# Patient Record
Sex: FEMALE | Race: WHITE | Employment: FULL TIME | URBAN - METROPOLITAN AREA
[De-identification: names, ages, dates, MRNs, and addresses within clinical notes are randomized per-mention and may not be internally consistent; named-entity substitution may affect disease eponyms.]

---

## 2017-04-04 ENCOUNTER — TELEPHONE (OUTPATIENT)
Dept: FAMILY MEDICINE CLINIC | Facility: CLINIC | Age: 47
End: 2017-04-04

## 2017-07-24 ENCOUNTER — OFFICE VISIT (OUTPATIENT)
Dept: FAMILY MEDICINE CLINIC | Facility: CLINIC | Age: 47
End: 2017-07-24

## 2017-07-24 VITALS
DIASTOLIC BLOOD PRESSURE: 88 MMHG | HEART RATE: 70 BPM | HEIGHT: 65 IN | BODY MASS INDEX: 34.16 KG/M2 | WEIGHT: 205 LBS | SYSTOLIC BLOOD PRESSURE: 120 MMHG | TEMPERATURE: 98 F | RESPIRATION RATE: 18 BRPM | OXYGEN SATURATION: 98 %

## 2017-07-24 DIAGNOSIS — Z12.31 SCREENING MAMMOGRAM, ENCOUNTER FOR: ICD-10-CM

## 2017-07-24 DIAGNOSIS — I83.93 VARICOSE VEINS OF BOTH LOWER EXTREMITIES: ICD-10-CM

## 2017-07-24 DIAGNOSIS — N39.3 STRESS INCONTINENCE: ICD-10-CM

## 2017-07-24 DIAGNOSIS — Z01.419 WELL WOMAN EXAM: Primary | ICD-10-CM

## 2017-07-24 PROCEDURE — 99396 PREV VISIT EST AGE 40-64: CPT | Performed by: INTERNAL MEDICINE

## 2017-07-24 RX ORDER — OXYBUTYNIN CHLORIDE 5 MG/1
5 TABLET ORAL 2 TIMES DAILY
Qty: 60 TABLET | Refills: 3 | Status: SHIPPED | OUTPATIENT
Start: 2017-07-24 | End: 2017-12-23

## 2017-07-24 NOTE — PROGRESS NOTES
HPI:   Israel Thakkar is a 55year old female who presents for a well woman exam. Symptoms: denies discharge, itching, burning or dysuria, periods are regular, flow is 4 days. History of migraines since age 9.  Typically gets them 1-2 x/month, lately they excessive thirst, denies significant weight change  ALL/ASTHMA: denies hx of allergy or asthma    EXAM:   /88   Pulse 70   Temp 97.8 °F (36.6 °C) (Oral)   Resp 18   Ht 65\"   Wt 205 lb   LMP 07/04/2017   SpO2 98%   BMI 34.11 kg/m²       Body mass ind

## 2017-07-24 NOTE — PATIENT INSTRUCTIONS
4 Steps for Eating Healthier  Changing the way you eat can improve your health. It can lower your cholesterol and blood pressure, and help you stay at a healthy weight. Your diet doesn’t have to be bland and boring to be healthy.  Just watch your calories · Drink more water to match your fiber increase. This is to help prevent constipation. Date Last Reviewed: 5/11/2015  © 2403-5593 The [facility] [place], [address] [place] [address]. All rights reserved.  This information is not intended a

## 2017-08-01 ENCOUNTER — LAB ENCOUNTER (OUTPATIENT)
Dept: LAB | Age: 47
End: 2017-08-01
Attending: FAMILY MEDICINE
Payer: COMMERCIAL

## 2017-08-01 ENCOUNTER — HOSPITAL ENCOUNTER (OUTPATIENT)
Dept: MAMMOGRAPHY | Age: 47
Discharge: HOME | End: 2017-08-01
Attending: INTERNAL MEDICINE
Payer: COMMERCIAL

## 2017-08-01 DIAGNOSIS — Z12.31 SCREENING MAMMOGRAM, ENCOUNTER FOR: ICD-10-CM

## 2017-08-01 DIAGNOSIS — Z01.419 WELL WOMAN EXAM: ICD-10-CM

## 2017-08-01 LAB
25-HYDROXYVITAMIN D (TOTAL): 31 NG/ML (ref 30–100)
ALBUMIN SERPL-MCNC: 3.2 G/DL (ref 3.5–4.8)
ALP LIVER SERPL-CCNC: 76 U/L (ref 39–100)
ALT SERPL-CCNC: 13 U/L (ref 14–54)
AST SERPL-CCNC: 10 U/L (ref 15–41)
BASOPHILS # BLD AUTO: 0.07 X10(3) UL (ref 0–0.1)
BASOPHILS NFR BLD AUTO: 1.1 %
BILIRUB SERPL-MCNC: 0.3 MG/DL (ref 0.1–2)
BUN BLD-MCNC: 15 MG/DL (ref 8–20)
CALCIUM BLD-MCNC: 8.9 MG/DL (ref 8.3–10.3)
CHLORIDE: 105 MMOL/L (ref 101–111)
CHOLEST SMN-MCNC: 208 MG/DL (ref ?–200)
CO2: 25 MMOL/L (ref 22–32)
CREAT BLD-MCNC: 0.96 MG/DL (ref 0.55–1.02)
EOSINOPHIL # BLD AUTO: 0.13 X10(3) UL (ref 0–0.3)
EOSINOPHIL NFR BLD AUTO: 2 %
ERYTHROCYTE [DISTWIDTH] IN BLOOD BY AUTOMATED COUNT: 13.1 % (ref 11.5–16)
GLUCOSE BLD-MCNC: 85 MG/DL (ref 70–99)
HCT VFR BLD AUTO: 37.2 % (ref 34–50)
HDLC SERPL-MCNC: 41 MG/DL (ref 45–?)
HDLC SERPL: 5.07 {RATIO} (ref ?–4.44)
HGB BLD-MCNC: 12 G/DL (ref 12–16)
IMMATURE GRANULOCYTE COUNT: 0.02 X10(3) UL (ref 0–1)
IMMATURE GRANULOCYTE RATIO %: 0.3 %
LDLC SERPL CALC-MCNC: 122 MG/DL (ref ?–130)
LDLC SERPL-MCNC: 45 MG/DL (ref 5–40)
LYMPHOCYTES # BLD AUTO: 2.41 X10(3) UL (ref 0.9–4)
LYMPHOCYTES NFR BLD AUTO: 36.3 %
M PROTEIN MFR SERPL ELPH: 7.7 G/DL (ref 6.1–8.3)
MCH RBC QN AUTO: 30.8 PG (ref 27–33.2)
MCHC RBC AUTO-ENTMCNC: 32.3 G/DL (ref 31–37)
MCV RBC AUTO: 95.4 FL (ref 81–100)
MONOCYTES # BLD AUTO: 0.43 X10(3) UL (ref 0.1–0.6)
MONOCYTES NFR BLD AUTO: 6.5 %
NEUTROPHIL ABS PRELIM: 3.57 X10 (3) UL (ref 1.3–6.7)
NEUTROPHILS # BLD AUTO: 3.57 X10(3) UL (ref 1.3–6.7)
NEUTROPHILS NFR BLD AUTO: 53.8 %
NONHDLC SERPL-MCNC: 167 MG/DL (ref ?–130)
PLATELET # BLD AUTO: 338 10(3)UL (ref 150–450)
POTASSIUM SERPL-SCNC: 4 MMOL/L (ref 3.6–5.1)
RBC # BLD AUTO: 3.9 X10(6)UL (ref 3.8–5.1)
RED CELL DISTRIBUTION WIDTH-SD: 46 FL (ref 35.1–46.3)
SODIUM SERPL-SCNC: 139 MMOL/L (ref 136–144)
TRIGLYCERIDES: 224 MG/DL (ref ?–150)
TSI SER-ACNC: 1.46 MIU/ML (ref 0.35–5.5)
WBC # BLD AUTO: 6.6 X10(3) UL (ref 4–13)

## 2017-08-01 PROCEDURE — 77063 BREAST TOMOSYNTHESIS BI: CPT | Performed by: INTERNAL MEDICINE

## 2017-08-01 PROCEDURE — 84443 ASSAY THYROID STIM HORMONE: CPT

## 2017-08-01 PROCEDURE — 77067 SCR MAMMO BI INCL CAD: CPT | Performed by: INTERNAL MEDICINE

## 2017-08-01 PROCEDURE — 85025 COMPLETE CBC W/AUTO DIFF WBC: CPT

## 2017-08-01 PROCEDURE — 80053 COMPREHEN METABOLIC PANEL: CPT

## 2017-08-01 PROCEDURE — 82306 VITAMIN D 25 HYDROXY: CPT

## 2017-08-01 PROCEDURE — 80061 LIPID PANEL: CPT

## 2017-08-01 PROCEDURE — 36415 COLL VENOUS BLD VENIPUNCTURE: CPT

## 2017-08-10 ENCOUNTER — OFFICE VISIT (OUTPATIENT)
Dept: SURGERY | Facility: CLINIC | Age: 47
End: 2017-08-10

## 2017-08-10 VITALS
RESPIRATION RATE: 17 BRPM | HEIGHT: 65 IN | WEIGHT: 207 LBS | TEMPERATURE: 98 F | HEART RATE: 96 BPM | SYSTOLIC BLOOD PRESSURE: 132 MMHG | BODY MASS INDEX: 34.49 KG/M2 | DIASTOLIC BLOOD PRESSURE: 87 MMHG

## 2017-08-10 DIAGNOSIS — M79.605 LEG PAIN, LEFT: Primary | ICD-10-CM

## 2017-08-10 PROCEDURE — 99243 OFF/OP CNSLTJ NEW/EST LOW 30: CPT | Performed by: SURGERY

## 2017-08-11 NOTE — H&P
New Patient  Norma Dean  10/20/1970    8/10/2017    This patient was referred by Lula Moreno MD for evaluation and consultation for left leg pain . Chief Complaint: left leg pain    History of Present Illness:  The patient is a 68-year-old femal History   Problem Relation Age of Onset   • Breast Cancer Maternal Grandmother 72   • Ovarian Cancer Paternal Grandmother 76   • Asthma Mother    • kidney cancer [OTHER] Father        Objective/Physical Exam:  General: Alert, orientated x3. Cooperative. that over the past 12 months she has had for specific episodes of left leg pain. She notes that these episodes may have been triggered by excessive activity and exercise.   She notes that the pain in the left leg is in the anterior medial aspect of the pop

## 2017-12-26 RX ORDER — OXYBUTYNIN CHLORIDE 5 MG/1
TABLET ORAL
Qty: 60 TABLET | Refills: 6 | Status: SHIPPED | OUTPATIENT
Start: 2017-12-26 | End: 2021-01-04

## 2018-01-29 ENCOUNTER — OFFICE VISIT (OUTPATIENT)
Dept: FAMILY MEDICINE CLINIC | Facility: CLINIC | Age: 48
End: 2018-01-29

## 2018-01-29 VITALS
SYSTOLIC BLOOD PRESSURE: 120 MMHG | DIASTOLIC BLOOD PRESSURE: 82 MMHG | RESPIRATION RATE: 16 BRPM | HEIGHT: 64.5 IN | OXYGEN SATURATION: 97 % | BODY MASS INDEX: 33.73 KG/M2 | HEART RATE: 112 BPM | TEMPERATURE: 99 F | WEIGHT: 200 LBS

## 2018-01-29 DIAGNOSIS — J02.9 PHARYNGITIS, UNSPECIFIED ETIOLOGY: ICD-10-CM

## 2018-01-29 DIAGNOSIS — J02.9 SORE THROAT: Primary | ICD-10-CM

## 2018-01-29 LAB — CONTROL LINE PRESENT WITH A CLEAR BACKGROUND (YES/NO): YES YES/NO

## 2018-01-29 PROCEDURE — 87081 CULTURE SCREEN ONLY: CPT | Performed by: NURSE PRACTITIONER

## 2018-01-29 PROCEDURE — 87880 STREP A ASSAY W/OPTIC: CPT | Performed by: NURSE PRACTITIONER

## 2018-01-29 PROCEDURE — 99213 OFFICE O/P EST LOW 20 MIN: CPT | Performed by: NURSE PRACTITIONER

## 2018-01-29 NOTE — PATIENT INSTRUCTIONS
Pharyngitis (Sore Throat), Report Pending    Pharyngitis (sore throat) is often due to a virus. It can also be caused by the streptococcus, or strep, bacterium, often called strep throat.  Both viral and strep infections can cause throat pain that is wors · For children: Use acetaminophen for fever, fussiness, or discomfort.  In infants older than 10months of age, you may use ibuprofen instead of acetaminophen. Talk with your child's healthcare provider before giving these medicines if your child has chronic · Signs of dehydration (very dark urine or no urine, sunken eyes, dizziness)  · Trouble breathing or noisy breathing  · Muffled voice  · New rash  · Child appears to be getting sicker  Date Last Reviewed: 4/13/2015  © 2221-9039 The [employer] [address].  8

## 2018-01-29 NOTE — PROGRESS NOTES
CHIEF COMPLAINT:   Patient presents with:  Sore Throat: 4 days. HPI:   Piper Diez is a 52year old female presents to clinic with complaint of sore throat. Patient has had for 4 days. Symptoms have been unchanged since onset.   Patient reports labored. CARDIO: RRR without murmur  GI: good BS's,no masses, hepatosplenomegaly, or tenderness on direct palpation  EXTREMITIES: no cyanosis, clubbing or edema  LYMPH: no anterior cervical. no submandibular lymphadenopathy.   No posterior cervical or occi

## 2018-05-12 ENCOUNTER — HOSPITAL ENCOUNTER (OUTPATIENT)
Age: 48
Discharge: HOME | End: 2018-05-12
Payer: COMMERCIAL

## 2018-05-12 VITALS
RESPIRATION RATE: 18 BRPM | SYSTOLIC BLOOD PRESSURE: 145 MMHG | TEMPERATURE: 97 F | DIASTOLIC BLOOD PRESSURE: 90 MMHG | HEART RATE: 74 BPM | OXYGEN SATURATION: 98 %

## 2018-05-12 DIAGNOSIS — Z91.09 SUN ALLERGY: ICD-10-CM

## 2018-05-12 DIAGNOSIS — R21 RASH AND NONSPECIFIC SKIN ERUPTION: Primary | ICD-10-CM

## 2018-05-12 PROCEDURE — 99203 OFFICE O/P NEW LOW 30 MIN: CPT

## 2018-05-12 PROCEDURE — 99213 OFFICE O/P EST LOW 20 MIN: CPT

## 2018-05-12 RX ORDER — PREDNISONE 10 MG/1
TABLET ORAL
Qty: 30 TABLET | Refills: 0 | Status: SHIPPED | OUTPATIENT
Start: 2018-05-12 | End: 2018-09-04 | Stop reason: ALTCHOICE

## 2018-05-12 NOTE — ED PROVIDER NOTES
Patient Seen in: [facility]    History   Patient presents with:  Rash    Stated Complaint: Rash x 7 days    HPI    59-year-old female who states that she is extremely sensitive to the sun and every year in the beginning of sprin Pulmonary/Chest: Effort normal and breath sounds normal. No respiratory distress. She has no wheezes. She has no rales. Musculoskeletal: Normal range of motion. She exhibits no edema or tenderness. Lymphadenopathy:     She has no cervical adenopathy. I have given the patient instructions regarding her diagnosis, expectations, follow up, and return to the ER precautions.   I explained to the patient that emergent conditions may arise to return to the immediate care or ER for new, worsening or any persist

## 2018-09-04 ENCOUNTER — OFFICE VISIT (OUTPATIENT)
Dept: FAMILY MEDICINE CLINIC | Facility: CLINIC | Age: 48
End: 2018-09-04
Payer: COMMERCIAL

## 2018-09-04 VITALS
DIASTOLIC BLOOD PRESSURE: 76 MMHG | RESPIRATION RATE: 18 BRPM | OXYGEN SATURATION: 99 % | HEIGHT: 65 IN | HEART RATE: 78 BPM | BODY MASS INDEX: 31.16 KG/M2 | TEMPERATURE: 98 F | SYSTOLIC BLOOD PRESSURE: 118 MMHG | WEIGHT: 187 LBS

## 2018-09-04 DIAGNOSIS — G43.009 MIGRAINE WITHOUT AURA AND WITHOUT STATUS MIGRAINOSUS, NOT INTRACTABLE: ICD-10-CM

## 2018-09-04 DIAGNOSIS — Z12.31 ENCOUNTER FOR SCREENING MAMMOGRAM FOR MALIGNANT NEOPLASM OF BREAST: ICD-10-CM

## 2018-09-04 DIAGNOSIS — Z00.00 ANNUAL PHYSICAL EXAM: Primary | ICD-10-CM

## 2018-09-04 PROCEDURE — 99396 PREV VISIT EST AGE 40-64: CPT | Performed by: INTERNAL MEDICINE

## 2018-09-04 NOTE — PATIENT INSTRUCTIONS
LAB HOURS & LOCATIONS        Clarita  [facility])    [address]  [address] [place] [address]     ([facility])         [address] [place] [address] Ave    Mon-Fri  5am-8pm     Mon-Fri   7am-4pm  Sat         6am-3pm     Sat          7am-3pm      Alexsandra Ordaz

## 2018-09-05 ENCOUNTER — OCC HEALTH (OUTPATIENT)
Dept: OCCUPATIONAL MEDICINE | Age: 48
End: 2018-09-05
Attending: PHYSICIAN ASSISTANT

## 2018-09-07 ENCOUNTER — OFFICE VISIT (OUTPATIENT)
Dept: OCCUPATIONAL MEDICINE | Age: 48
End: 2018-09-07
Attending: PHYSICIAN ASSISTANT

## 2018-09-08 NOTE — PROGRESS NOTES
HPI:   Elkin Verma is a 52year old female who presents for a well woman exam. Symptoms: denies discharge, itching, burning or dysuria, periods are regular, flow is 4-5 days, few days of spotting afterwards.     Patient's last menstrual period was 08/16 thyroid history, denies excessive thirst, denies significant weight change  ALL/ASTHMA: denies hx of allergy or asthma    EXAM:   /76   Pulse 78   Temp 98.3 °F (36.8 °C) (Oral)   Resp 18   Ht 65\"   Wt 187 lb   LMP 08/16/2018   SpO2 99%   BMI 31.12 k

## 2018-11-05 ENCOUNTER — LAB ENCOUNTER (OUTPATIENT)
Dept: LAB | Age: 48
End: 2018-11-05
Attending: FAMILY MEDICINE
Payer: COMMERCIAL

## 2018-11-05 ENCOUNTER — TELEPHONE (OUTPATIENT)
Dept: FAMILY MEDICINE CLINIC | Facility: CLINIC | Age: 48
End: 2018-11-05

## 2018-11-05 DIAGNOSIS — G43.009 MIGRAINE WITHOUT AURA AND WITHOUT STATUS MIGRAINOSUS, NOT INTRACTABLE: ICD-10-CM

## 2018-11-05 DIAGNOSIS — Z00.00 ANNUAL PHYSICAL EXAM: ICD-10-CM

## 2018-11-05 DIAGNOSIS — E78.00 PURE HYPERCHOLESTEROLEMIA: Primary | ICD-10-CM

## 2018-11-05 PROCEDURE — 80050 GENERAL HEALTH PANEL: CPT | Performed by: INTERNAL MEDICINE

## 2018-11-05 PROCEDURE — 80061 LIPID PANEL: CPT | Performed by: INTERNAL MEDICINE

## 2018-11-05 PROCEDURE — 86003 ALLG SPEC IGE CRUDE XTRC EA: CPT | Performed by: INTERNAL MEDICINE

## 2018-11-05 PROCEDURE — 36415 COLL VENOUS BLD VENIPUNCTURE: CPT | Performed by: INTERNAL MEDICINE

## 2018-11-05 PROCEDURE — 82306 VITAMIN D 25 HYDROXY: CPT | Performed by: INTERNAL MEDICINE

## 2018-11-05 NOTE — TELEPHONE ENCOUNTER
Pt drop off a wellness form from [facility], pt is requesting to pick it up by Wednesday if possible. Pt mentioned she was just here for her px with np Amrit Cabrera. Please call when form is ready for .  Form place

## 2018-11-06 ENCOUNTER — HOSPITAL ENCOUNTER (OUTPATIENT)
Dept: MAMMOGRAPHY | Age: 48
Discharge: HOME | End: 2018-11-06
Attending: INTERNAL MEDICINE
Payer: COMMERCIAL

## 2018-11-06 DIAGNOSIS — Z12.31 ENCOUNTER FOR SCREENING MAMMOGRAM FOR MALIGNANT NEOPLASM OF BREAST: ICD-10-CM

## 2018-11-06 PROCEDURE — 77067 SCR MAMMO BI INCL CAD: CPT | Performed by: INTERNAL MEDICINE

## 2018-11-06 PROCEDURE — 77063 BREAST TOMOSYNTHESIS BI: CPT | Performed by: INTERNAL MEDICINE

## 2018-11-07 NOTE — TELEPHONE ENCOUNTER
Form and PE done 9-4-18 reviewed per AV as YONATHAN is out of office, signed per AV, given to pt. Copy sent to scan. Task completed.

## 2019-07-31 ENCOUNTER — PATIENT MESSAGE (OUTPATIENT)
Dept: FAMILY MEDICINE CLINIC | Facility: CLINIC | Age: 49
End: 2019-07-31

## 2019-07-31 NOTE — TELEPHONE ENCOUNTER
From: Diego Dean  To: Viktoria Bynum NP  Sent: 7/31/2019 12:01 PM CDT  Subject: Other    Hi, I will be beginning a new job at [employer] and need the attached form filled out.  The HR department said an exam in the last twelve months

## 2021-01-04 ENCOUNTER — OFFICE VISIT (OUTPATIENT)
Dept: FAMILY MEDICINE CLINIC | Facility: CLINIC | Age: 51
End: 2021-01-04
Payer: COMMERCIAL

## 2021-01-04 VITALS
TEMPERATURE: 98 F | HEIGHT: 65 IN | WEIGHT: 207 LBS | DIASTOLIC BLOOD PRESSURE: 80 MMHG | SYSTOLIC BLOOD PRESSURE: 132 MMHG | BODY MASS INDEX: 34.49 KG/M2 | OXYGEN SATURATION: 98 % | RESPIRATION RATE: 20 BRPM | HEART RATE: 85 BPM

## 2021-01-04 DIAGNOSIS — Z00.00 ROUTINE GENERAL MEDICAL EXAMINATION AT A HEALTH CARE FACILITY: Primary | ICD-10-CM

## 2021-01-04 DIAGNOSIS — Z12.4 SCREENING FOR CERVICAL CANCER: ICD-10-CM

## 2021-01-04 DIAGNOSIS — Z12.31 ENCOUNTER FOR SCREENING MAMMOGRAM FOR BREAST CANCER: ICD-10-CM

## 2021-01-04 PROCEDURE — 88175 CYTOPATH C/V AUTO FLUID REDO: CPT | Performed by: INTERNAL MEDICINE

## 2021-01-04 PROCEDURE — 3079F DIAST BP 80-89 MM HG: CPT | Performed by: INTERNAL MEDICINE

## 2021-01-04 PROCEDURE — 99396 PREV VISIT EST AGE 40-64: CPT | Performed by: INTERNAL MEDICINE

## 2021-01-04 PROCEDURE — 3075F SYST BP GE 130 - 139MM HG: CPT | Performed by: INTERNAL MEDICINE

## 2021-01-04 PROCEDURE — 87624 HPV HI-RISK TYP POOLED RSLT: CPT | Performed by: INTERNAL MEDICINE

## 2021-01-04 PROCEDURE — 3008F BODY MASS INDEX DOCD: CPT | Performed by: INTERNAL MEDICINE

## 2021-01-04 RX ORDER — EPINEPHRINE 0.3 MG/.3ML
1 SOLUTION INTRAMUSCULAR; SUBCUTANEOUS AS NEEDED
Qty: 1 EACH | Refills: 0 | Status: SHIPPED | OUTPATIENT
Start: 2021-01-04 | End: 2021-01-05

## 2021-01-04 NOTE — PROGRESS NOTES
HPI:   Corey Venegas is a 48year old female who presents for a well woman exam. Symptoms: denies discharge, itching, burning or dysuria, periods are regular, flow is lesser days. Very few hot flashes/night sweats.     Patient's last menstrual period was LMP 12/23/2020   SpO2 98%   BMI 34.45 kg/m²       Body mass index is 34.45 kg/m².       GENERAL: well developed, well nourished,in no apparent distress  SKIN: warm & dry  HEENT: atraumatic, normocephalic; PERRLA, conjunctiva clear, ears and throat are clear

## 2021-01-06 ENCOUNTER — LAB ENCOUNTER (OUTPATIENT)
Dept: LAB | Age: 51
End: 2021-01-06
Attending: INTERNAL MEDICINE
Payer: COMMERCIAL

## 2021-01-06 DIAGNOSIS — Z00.00 ROUTINE GENERAL MEDICAL EXAMINATION AT A HEALTH CARE FACILITY: ICD-10-CM

## 2021-01-06 LAB
ALBUMIN SERPL-MCNC: 3.4 G/DL (ref 3.4–5)
ALBUMIN/GLOB SERPL: 0.7 {RATIO} (ref 1–2)
ALP LIVER SERPL-CCNC: 83 U/L
ALT SERPL-CCNC: 12 U/L
ANION GAP SERPL CALC-SCNC: 7 MMOL/L (ref 0–18)
AST SERPL-CCNC: 9 U/L (ref 15–37)
BASOPHILS # BLD AUTO: 0.05 X10(3) UL (ref 0–0.2)
BASOPHILS NFR BLD AUTO: 0.7 %
BILIRUB SERPL-MCNC: 0.4 MG/DL (ref 0.1–2)
BUN BLD-MCNC: 11 MG/DL (ref 7–18)
BUN/CREAT SERPL: 14.3 (ref 10–20)
CALCIUM BLD-MCNC: 8.8 MG/DL (ref 8.5–10.1)
CHLORIDE SERPL-SCNC: 106 MMOL/L (ref 98–112)
CHOLEST SMN-MCNC: 263 MG/DL (ref ?–200)
CO2 SERPL-SCNC: 26 MMOL/L (ref 21–32)
CREAT BLD-MCNC: 0.77 MG/DL
DEPRECATED RDW RBC AUTO: 48.9 FL (ref 35.1–46.3)
EOSINOPHIL # BLD AUTO: 0.15 X10(3) UL (ref 0–0.7)
EOSINOPHIL NFR BLD AUTO: 2.2 %
ERYTHROCYTE [DISTWIDTH] IN BLOOD BY AUTOMATED COUNT: 13.9 % (ref 11–15)
GLOBULIN PLAS-MCNC: 4.7 G/DL (ref 2.8–4.4)
GLUCOSE BLD-MCNC: 99 MG/DL (ref 70–99)
HCT VFR BLD AUTO: 37.6 %
HDLC SERPL-MCNC: 54 MG/DL (ref 40–59)
HGB BLD-MCNC: 12 G/DL
HPV I/H RISK 1 DNA SPEC QL NAA+PROBE: NEGATIVE
IMM GRANULOCYTES # BLD AUTO: 0.01 X10(3) UL (ref 0–1)
IMM GRANULOCYTES NFR BLD: 0.1 %
LDLC SERPL CALC-MCNC: 177 MG/DL (ref ?–100)
LYMPHOCYTES # BLD AUTO: 1.91 X10(3) UL (ref 1–4)
LYMPHOCYTES NFR BLD AUTO: 28.3 %
M PROTEIN MFR SERPL ELPH: 8.1 G/DL (ref 6.4–8.2)
MCH RBC QN AUTO: 30.8 PG (ref 26–34)
MCHC RBC AUTO-ENTMCNC: 31.9 G/DL (ref 31–37)
MCV RBC AUTO: 96.7 FL
MONOCYTES # BLD AUTO: 0.52 X10(3) UL (ref 0.1–1)
MONOCYTES NFR BLD AUTO: 7.7 %
NEUTROPHILS # BLD AUTO: 4.12 X10 (3) UL (ref 1.5–7.7)
NEUTROPHILS # BLD AUTO: 4.12 X10(3) UL (ref 1.5–7.7)
NEUTROPHILS NFR BLD AUTO: 61 %
NONHDLC SERPL-MCNC: 209 MG/DL (ref ?–130)
OSMOLALITY SERPL CALC.SUM OF ELEC: 287 MOSM/KG (ref 275–295)
PATIENT FASTING Y/N/NP: YES
PATIENT FASTING Y/N/NP: YES
PLATELET # BLD AUTO: 322 10(3)UL (ref 150–450)
POTASSIUM SERPL-SCNC: 4.3 MMOL/L (ref 3.5–5.1)
RBC # BLD AUTO: 3.89 X10(6)UL
SODIUM SERPL-SCNC: 139 MMOL/L (ref 136–145)
TRIGL SERPL-MCNC: 162 MG/DL (ref 30–149)
TSI SER-ACNC: 1.83 MIU/ML (ref 0.36–3.74)
VIT D+METAB SERPL-MCNC: 28.8 NG/ML (ref 30–100)
VLDLC SERPL CALC-MCNC: 32 MG/DL (ref 0–30)
WBC # BLD AUTO: 6.8 X10(3) UL (ref 4–11)

## 2021-01-06 PROCEDURE — 84443 ASSAY THYROID STIM HORMONE: CPT

## 2021-01-06 PROCEDURE — 80061 LIPID PANEL: CPT

## 2021-01-06 PROCEDURE — 85025 COMPLETE CBC W/AUTO DIFF WBC: CPT

## 2021-01-06 PROCEDURE — 36415 COLL VENOUS BLD VENIPUNCTURE: CPT

## 2021-01-06 PROCEDURE — 80053 COMPREHEN METABOLIC PANEL: CPT

## 2021-01-06 PROCEDURE — 82306 VITAMIN D 25 HYDROXY: CPT

## 2021-01-07 DIAGNOSIS — E78.00 HIGH CHOLESTEROL: Primary | ICD-10-CM

## 2021-01-09 ENCOUNTER — HOSPITAL ENCOUNTER (OUTPATIENT)
Dept: MAMMOGRAPHY | Age: 51
Discharge: HOME | End: 2021-01-09
Attending: INTERNAL MEDICINE
Payer: COMMERCIAL

## 2021-01-09 DIAGNOSIS — Z12.31 ENCOUNTER FOR SCREENING MAMMOGRAM FOR BREAST CANCER: ICD-10-CM

## 2021-01-09 PROCEDURE — 77067 SCR MAMMO BI INCL CAD: CPT | Performed by: INTERNAL MEDICINE

## 2021-01-09 PROCEDURE — 77063 BREAST TOMOSYNTHESIS BI: CPT | Performed by: INTERNAL MEDICINE

## 2021-01-18 ENCOUNTER — HOSPITAL ENCOUNTER (OUTPATIENT)
Dept: MAMMOGRAPHY | Age: 51
Discharge: HOME | End: 2021-01-18
Attending: INTERNAL MEDICINE
Payer: COMMERCIAL

## 2021-01-18 ENCOUNTER — HOSPITAL ENCOUNTER (OUTPATIENT)
Dept: ULTRASOUND IMAGING | Age: 51
Discharge: HOME | End: 2021-01-18
Attending: INTERNAL MEDICINE
Payer: COMMERCIAL

## 2021-01-18 DIAGNOSIS — R92.2 INCONCLUSIVE MAMMOGRAM: ICD-10-CM

## 2021-01-18 PROCEDURE — 76642 ULTRASOUND BREAST LIMITED: CPT | Performed by: INTERNAL MEDICINE

## 2021-01-18 PROCEDURE — 77061 BREAST TOMOSYNTHESIS UNI: CPT | Performed by: INTERNAL MEDICINE

## 2021-01-18 PROCEDURE — 77065 DX MAMMO INCL CAD UNI: CPT | Performed by: INTERNAL MEDICINE

## 2021-05-17 NOTE — LETTER
Patient Name: Lindsey Dean        : 10/20/1970       Medical Record #: [id number]    CONSENT FOR PROCEDURES/SEDATION    Date: 2024       Time: 8:49 AM        1. I authorize the performance upon Lindsey Dean the following:    _______________Image Guided Bone Marrow Biopsy and Aspiration______________    2. I authorize Dr. Boogie Edwards (and whomever is designated as the doctor’s assistant), to perform the above mentioned procedures.    3. If any unforeseen conditions arise during this procedure calling for additional procedures, operations, or medications (including anesthesia and blood transfusion), I  further request and authorize the doctor to do whatever he/she deems advisable in my interest.    4. I consent to the taking and reproduction of any photographs in the course of this procedure for professional purposes.    5. I consent to the administration of such sedation as may be considered necessary or advisable by the physician responsible for this service, with the exception of  ___Radiology contrast dyes, Iodine_______.    6. I have been informed by my doctor of the nature and purpose of this procedure/sedation, possible alternative methods of treatment, risk involved and possible complications.      Signature of Patient:  ___________________________    Signature of person authorized to consent for patient: Relationship to patient:  ___________________________    ___________________    Witness: ____________________     Date: ______________    Provider: ____________________     Date: ______________  
To: Dr Miguel  Patient Name: Lindsey Dean  -Age / Sex: 10/20/1970-A: 54 y  female   Medical Records: [id number] [facility]: [id number]    Request for History & Physical for Radiology Procedure at [facility]    The above patient is scheduled to have a procedure performed in Radiology.  In order for the procedure to be performed safely, a comprehensive History & Physical, to include the Review of Systems, is required within 30 days of the scheduled appointment.      Procedure:    Date Scheduled:   Ordered by (Ordering Physician):  Anesthesia    Please FAX the completed H&P to [facility] at [phone].  For Questions: Call [facility] [phone]    If you cannot provide us with a comprehensive History & Physical prior to this appointment, you will need to cancel and reschedule the appointment by calling Central Scheduling [phone].  Thank you.  
Ambulance

## 2021-11-02 ENCOUNTER — LAB ENCOUNTER (OUTPATIENT)
Dept: LAB | Age: 51
End: 2021-11-02
Attending: INTERNAL MEDICINE
Payer: COMMERCIAL

## 2021-11-02 ENCOUNTER — OFFICE VISIT (OUTPATIENT)
Dept: FAMILY MEDICINE CLINIC | Facility: CLINIC | Age: 51
End: 2021-11-02
Payer: COMMERCIAL

## 2021-11-02 VITALS
HEART RATE: 79 BPM | RESPIRATION RATE: 20 BRPM | DIASTOLIC BLOOD PRESSURE: 84 MMHG | HEIGHT: 65 IN | SYSTOLIC BLOOD PRESSURE: 128 MMHG | TEMPERATURE: 98 F | OXYGEN SATURATION: 98 % | BODY MASS INDEX: 33.32 KG/M2 | WEIGHT: 200 LBS

## 2021-11-02 DIAGNOSIS — Z12.31 ENCOUNTER FOR SCREENING MAMMOGRAM FOR BREAST CANCER: ICD-10-CM

## 2021-11-02 DIAGNOSIS — N92.6 IRREGULAR MENSTRUAL BLEEDING: ICD-10-CM

## 2021-11-02 DIAGNOSIS — N92.6 IRREGULAR MENSTRUAL BLEEDING: Primary | ICD-10-CM

## 2021-11-02 PROCEDURE — 3079F DIAST BP 80-89 MM HG: CPT | Performed by: INTERNAL MEDICINE

## 2021-11-02 PROCEDURE — 99214 OFFICE O/P EST MOD 30 MIN: CPT | Performed by: INTERNAL MEDICINE

## 2021-11-02 PROCEDURE — 3074F SYST BP LT 130 MM HG: CPT | Performed by: INTERNAL MEDICINE

## 2021-11-02 PROCEDURE — 83540 ASSAY OF IRON: CPT | Performed by: INTERNAL MEDICINE

## 2021-11-02 PROCEDURE — 82670 ASSAY OF TOTAL ESTRADIOL: CPT | Performed by: INTERNAL MEDICINE

## 2021-11-02 PROCEDURE — 3008F BODY MASS INDEX DOCD: CPT | Performed by: INTERNAL MEDICINE

## 2021-11-02 PROCEDURE — 83550 IRON BINDING TEST: CPT | Performed by: INTERNAL MEDICINE

## 2021-11-02 NOTE — PROGRESS NOTES
Vikki Escoto is a 46year old female. HPI:   C/O On & off flow of menses - every hour a tampon and pad to a liner later, then the flow increases heavily again a day or two later. Started Sept 22nd normal menses schedule.  Upper leg, stomach and LBP w Irregular menstrual bleeding    - IRON AND TIBC; Future  - ESTRADIOL; Future  - US PELVIS (TRANSVAGINAL ONLY) (CPT=76830); Future    2. Encounter for screening mammogram for breast cancer    - [facility] DAMIÁN 2D+3D SCREENING BILAT (CPT=77067/65809);  Future    The

## 2021-11-24 ENCOUNTER — HOSPITAL ENCOUNTER (OUTPATIENT)
Dept: ULTRASOUND IMAGING | Age: 51
Discharge: HOME | End: 2021-11-24
Attending: INTERNAL MEDICINE
Payer: COMMERCIAL

## 2021-11-24 DIAGNOSIS — N92.6 IRREGULAR MENSTRUAL BLEEDING: ICD-10-CM

## 2021-11-24 PROCEDURE — 76830 TRANSVAGINAL US NON-OB: CPT | Performed by: INTERNAL MEDICINE

## 2021-11-24 PROCEDURE — 76856 US EXAM PELVIC COMPLETE: CPT | Performed by: INTERNAL MEDICINE

## 2022-01-17 ENCOUNTER — HOSPITAL ENCOUNTER (OUTPATIENT)
Dept: MAMMOGRAPHY | Age: 52
Discharge: HOME | End: 2022-01-17
Attending: INTERNAL MEDICINE
Payer: COMMERCIAL

## 2022-01-17 DIAGNOSIS — Z12.31 ENCOUNTER FOR SCREENING MAMMOGRAM FOR BREAST CANCER: ICD-10-CM

## 2022-01-17 PROCEDURE — 77067 SCR MAMMO BI INCL CAD: CPT | Performed by: INTERNAL MEDICINE

## 2022-01-17 PROCEDURE — 77063 BREAST TOMOSYNTHESIS BI: CPT | Performed by: INTERNAL MEDICINE

## 2022-04-29 ENCOUNTER — HOSPITAL ENCOUNTER (OUTPATIENT)
Dept: CT IMAGING | Facility: HOSPITAL | Age: 52
Discharge: HOME | End: 2022-04-29
Attending: FAMILY MEDICINE

## 2022-04-29 ENCOUNTER — ORDER TRANSCRIPTION (OUTPATIENT)
Dept: ADMINISTRATIVE | Facility: HOSPITAL | Age: 52
End: 2022-04-29

## 2022-04-29 DIAGNOSIS — Z13.6 SCREENING FOR CARDIOVASCULAR CONDITION: ICD-10-CM

## 2022-07-19 ENCOUNTER — OFFICE VISIT (OUTPATIENT)
Dept: FAMILY MEDICINE CLINIC | Facility: CLINIC | Age: 52
End: 2022-07-19
Payer: COMMERCIAL

## 2022-07-19 VITALS
HEART RATE: 80 BPM | WEIGHT: 200 LBS | OXYGEN SATURATION: 98 % | HEIGHT: 65 IN | BODY MASS INDEX: 33.32 KG/M2 | RESPIRATION RATE: 20 BRPM | SYSTOLIC BLOOD PRESSURE: 152 MMHG | DIASTOLIC BLOOD PRESSURE: 94 MMHG

## 2022-07-19 DIAGNOSIS — Z00.00 ROUTINE GENERAL MEDICAL EXAMINATION AT A HEALTH CARE FACILITY: Primary | ICD-10-CM

## 2022-07-19 DIAGNOSIS — Z12.31 ENCOUNTER FOR SCREENING MAMMOGRAM FOR BREAST CANCER: ICD-10-CM

## 2022-07-19 DIAGNOSIS — E55.9 VITAMIN D DEFICIENCY: ICD-10-CM

## 2022-07-19 DIAGNOSIS — I10 PRIMARY HYPERTENSION: ICD-10-CM

## 2022-07-19 PROCEDURE — 99396 PREV VISIT EST AGE 40-64: CPT | Performed by: INTERNAL MEDICINE

## 2022-07-19 PROCEDURE — 3080F DIAST BP >= 90 MM HG: CPT | Performed by: INTERNAL MEDICINE

## 2022-07-19 PROCEDURE — 3077F SYST BP >= 140 MM HG: CPT | Performed by: INTERNAL MEDICINE

## 2022-07-19 PROCEDURE — 3008F BODY MASS INDEX DOCD: CPT | Performed by: INTERNAL MEDICINE

## 2022-07-19 RX ORDER — LISINOPRIL 5 MG/1
5 TABLET ORAL DAILY
Qty: 90 TABLET | Refills: 0 | Status: SHIPPED | OUTPATIENT
Start: 2022-07-19

## 2022-07-27 ENCOUNTER — LAB ENCOUNTER (OUTPATIENT)
Dept: LAB | Age: 52
End: 2022-07-27
Attending: INTERNAL MEDICINE
Payer: COMMERCIAL

## 2022-07-27 DIAGNOSIS — Z00.00 ROUTINE GENERAL MEDICAL EXAMINATION AT A HEALTH CARE FACILITY: ICD-10-CM

## 2022-07-27 DIAGNOSIS — E55.9 VITAMIN D DEFICIENCY: ICD-10-CM

## 2022-07-27 LAB
ALBUMIN SERPL-MCNC: 3.1 G/DL (ref 3.4–5)
ALBUMIN/GLOB SERPL: 0.6 {RATIO} (ref 1–2)
ALP LIVER SERPL-CCNC: 76 U/L
ALT SERPL-CCNC: 13 U/L
ANION GAP SERPL CALC-SCNC: 3 MMOL/L (ref 0–18)
AST SERPL-CCNC: 9 U/L (ref 15–37)
BASOPHILS # BLD AUTO: 0.05 X10(3) UL (ref 0–0.2)
BASOPHILS NFR BLD AUTO: 0.9 %
BILIRUB SERPL-MCNC: 0.5 MG/DL (ref 0.1–2)
BUN BLD-MCNC: 10 MG/DL (ref 7–18)
CALCIUM BLD-MCNC: 8.4 MG/DL (ref 8.5–10.1)
CHLORIDE SERPL-SCNC: 109 MMOL/L (ref 98–112)
CHOLEST SERPL-MCNC: 212 MG/DL (ref ?–200)
CO2 SERPL-SCNC: 27 MMOL/L (ref 21–32)
CREAT BLD-MCNC: 0.86 MG/DL
EOSINOPHIL # BLD AUTO: 0.1 X10(3) UL (ref 0–0.7)
EOSINOPHIL NFR BLD AUTO: 1.7 %
ERYTHROCYTE [DISTWIDTH] IN BLOOD BY AUTOMATED COUNT: 15.9 %
FASTING PATIENT LIPID ANSWER: YES
FASTING STATUS PATIENT QL REPORTED: YES
GLOBULIN PLAS-MCNC: 4.9 G/DL (ref 2.8–4.4)
GLUCOSE BLD-MCNC: 91 MG/DL (ref 70–99)
HCT VFR BLD AUTO: 33.1 %
HDLC SERPL-MCNC: 51 MG/DL (ref 40–59)
HGB BLD-MCNC: 9.6 G/DL
IMM GRANULOCYTES # BLD AUTO: 0.01 X10(3) UL (ref 0–1)
IMM GRANULOCYTES NFR BLD: 0.2 %
LDLC SERPL CALC-MCNC: 131 MG/DL (ref ?–100)
LYMPHOCYTES # BLD AUTO: 2.01 X10(3) UL (ref 1–4)
LYMPHOCYTES NFR BLD AUTO: 34.8 %
MCH RBC QN AUTO: 28 PG (ref 26–34)
MCHC RBC AUTO-ENTMCNC: 29 G/DL (ref 31–37)
MCV RBC AUTO: 96.5 FL
MONOCYTES # BLD AUTO: 0.41 X10(3) UL (ref 0.1–1)
MONOCYTES NFR BLD AUTO: 7.1 %
NEUTROPHILS # BLD AUTO: 3.19 X10 (3) UL (ref 1.5–7.7)
NEUTROPHILS # BLD AUTO: 3.19 X10(3) UL (ref 1.5–7.7)
NEUTROPHILS NFR BLD AUTO: 55.3 %
NONHDLC SERPL-MCNC: 161 MG/DL (ref ?–130)
OSMOLALITY SERPL CALC.SUM OF ELEC: 287 MOSM/KG (ref 275–295)
PLATELET # BLD AUTO: 330 10(3)UL (ref 150–450)
POTASSIUM SERPL-SCNC: 3.9 MMOL/L (ref 3.5–5.1)
PROT SERPL-MCNC: 8 G/DL (ref 6.4–8.2)
RBC # BLD AUTO: 3.43 X10(6)UL
SODIUM SERPL-SCNC: 139 MMOL/L (ref 136–145)
TRIGL SERPL-MCNC: 167 MG/DL (ref 30–149)
TSI SER-ACNC: 1.4 MIU/ML (ref 0.36–3.74)
VIT D+METAB SERPL-MCNC: 31 NG/ML (ref 30–100)
VLDLC SERPL CALC-MCNC: 30 MG/DL (ref 0–30)
WBC # BLD AUTO: 5.8 X10(3) UL (ref 4–11)

## 2022-07-27 PROCEDURE — 36415 COLL VENOUS BLD VENIPUNCTURE: CPT

## 2022-07-27 PROCEDURE — 80061 LIPID PANEL: CPT

## 2022-07-27 PROCEDURE — 85025 COMPLETE CBC W/AUTO DIFF WBC: CPT

## 2022-07-27 PROCEDURE — 84443 ASSAY THYROID STIM HORMONE: CPT

## 2022-07-27 PROCEDURE — 80053 COMPREHEN METABOLIC PANEL: CPT

## 2022-07-27 PROCEDURE — 82306 VITAMIN D 25 HYDROXY: CPT

## 2022-08-04 DIAGNOSIS — D64.9 ANEMIA, UNSPECIFIED TYPE: Primary | ICD-10-CM

## 2022-08-12 ENCOUNTER — NURSE ONLY (OUTPATIENT)
Dept: FAMILY MEDICINE CLINIC | Facility: CLINIC | Age: 52
End: 2022-08-12
Payer: COMMERCIAL

## 2022-08-12 VITALS — SYSTOLIC BLOOD PRESSURE: 114 MMHG | DIASTOLIC BLOOD PRESSURE: 76 MMHG

## 2022-08-18 RX ORDER — LOSARTAN POTASSIUM 25 MG/1
TABLET ORAL
Qty: 45 TABLET | Refills: 1 | Status: SHIPPED | OUTPATIENT
Start: 2022-08-18

## 2022-11-18 ENCOUNTER — LAB ENCOUNTER (OUTPATIENT)
Dept: LAB | Age: 52
End: 2022-11-18
Attending: FAMILY MEDICINE
Payer: COMMERCIAL

## 2022-11-18 DIAGNOSIS — D64.9 ANEMIA, UNSPECIFIED TYPE: ICD-10-CM

## 2022-11-18 LAB
BASOPHILS # BLD AUTO: 0.05 X10(3) UL (ref 0–0.2)
BASOPHILS NFR BLD AUTO: 0.6 %
EOSINOPHIL # BLD AUTO: 0.1 X10(3) UL (ref 0–0.7)
EOSINOPHIL NFR BLD AUTO: 1.3 %
ERYTHROCYTE [DISTWIDTH] IN BLOOD BY AUTOMATED COUNT: 14.2 %
HCT VFR BLD AUTO: 37.6 %
HGB BLD-MCNC: 11.7 G/DL
IMM GRANULOCYTES # BLD AUTO: 0.03 X10(3) UL (ref 0–1)
IMM GRANULOCYTES NFR BLD: 0.4 %
IRON SATN MFR SERPL: 13 %
IRON SERPL-MCNC: 53 UG/DL
LYMPHOCYTES # BLD AUTO: 1.95 X10(3) UL (ref 1–4)
LYMPHOCYTES NFR BLD AUTO: 25 %
MCH RBC QN AUTO: 31.7 PG (ref 26–34)
MCHC RBC AUTO-ENTMCNC: 31.1 G/DL (ref 31–37)
MCV RBC AUTO: 101.9 FL
MONOCYTES # BLD AUTO: 0.56 X10(3) UL (ref 0.1–1)
MONOCYTES NFR BLD AUTO: 7.2 %
NEUTROPHILS # BLD AUTO: 5.11 X10 (3) UL (ref 1.5–7.7)
NEUTROPHILS # BLD AUTO: 5.11 X10(3) UL (ref 1.5–7.7)
NEUTROPHILS NFR BLD AUTO: 65.5 %
PLATELET # BLD AUTO: 333 10(3)UL (ref 150–450)
RBC # BLD AUTO: 3.69 X10(6)UL
TIBC SERPL-MCNC: 414 UG/DL (ref 240–450)
TRANSFERRIN SERPL-MCNC: 278 MG/DL (ref 200–360)
WBC # BLD AUTO: 7.8 X10(3) UL (ref 4–11)

## 2022-11-18 PROCEDURE — 83550 IRON BINDING TEST: CPT | Performed by: INTERNAL MEDICINE

## 2022-11-18 PROCEDURE — 85025 COMPLETE CBC W/AUTO DIFF WBC: CPT | Performed by: INTERNAL MEDICINE

## 2022-11-18 PROCEDURE — 83540 ASSAY OF IRON: CPT | Performed by: INTERNAL MEDICINE

## 2023-01-30 ENCOUNTER — HOSPITAL ENCOUNTER (OUTPATIENT)
Dept: MAMMOGRAPHY | Age: 53
Discharge: HOME | End: 2023-01-30
Attending: INTERNAL MEDICINE
Payer: COMMERCIAL

## 2023-01-30 DIAGNOSIS — Z12.31 ENCOUNTER FOR SCREENING MAMMOGRAM FOR BREAST CANCER: ICD-10-CM

## 2023-01-30 PROCEDURE — 77063 BREAST TOMOSYNTHESIS BI: CPT | Performed by: INTERNAL MEDICINE

## 2023-01-30 PROCEDURE — 77067 SCR MAMMO BI INCL CAD: CPT | Performed by: INTERNAL MEDICINE

## 2023-03-09 RX ORDER — LOSARTAN POTASSIUM 25 MG/1
TABLET ORAL
Qty: 90 TABLET | Refills: 2 | Status: SHIPPED | OUTPATIENT
Start: 2023-03-09

## 2023-03-14 RX ORDER — LOSARTAN POTASSIUM 25 MG/1
TABLET ORAL
Qty: 45 TABLET | Refills: 0 | OUTPATIENT
Start: 2023-03-14

## 2023-06-29 ENCOUNTER — OFFICE VISIT (OUTPATIENT)
Dept: FAMILY MEDICINE CLINIC | Facility: CLINIC | Age: 53
End: 2023-06-29
Payer: COMMERCIAL

## 2023-06-29 VITALS
RESPIRATION RATE: 18 BRPM | SYSTOLIC BLOOD PRESSURE: 138 MMHG | BODY MASS INDEX: 35.74 KG/M2 | HEART RATE: 85 BPM | OXYGEN SATURATION: 98 % | HEIGHT: 64 IN | DIASTOLIC BLOOD PRESSURE: 90 MMHG | WEIGHT: 209.38 LBS

## 2023-06-29 DIAGNOSIS — N32.81 OVERACTIVE BLADDER: ICD-10-CM

## 2023-06-29 DIAGNOSIS — K58.2 IRRITABLE BOWEL SYNDROME WITH BOTH CONSTIPATION AND DIARRHEA: ICD-10-CM

## 2023-06-29 DIAGNOSIS — Z23 NEED FOR TDAP VACCINATION: ICD-10-CM

## 2023-06-29 DIAGNOSIS — E66.01 CLASS 2 SEVERE OBESITY WITH SERIOUS COMORBIDITY AND BODY MASS INDEX (BMI) OF 35.0 TO 35.9 IN ADULT, UNSPECIFIED OBESITY TYPE (HCC): ICD-10-CM

## 2023-06-29 DIAGNOSIS — Z00.00 ROUTINE GENERAL MEDICAL EXAMINATION AT A HEALTH CARE FACILITY: Primary | ICD-10-CM

## 2023-06-29 PROCEDURE — 90715 TDAP VACCINE 7 YRS/> IM: CPT | Performed by: INTERNAL MEDICINE

## 2023-06-29 PROCEDURE — 3075F SYST BP GE 130 - 139MM HG: CPT | Performed by: INTERNAL MEDICINE

## 2023-06-29 PROCEDURE — 3080F DIAST BP >= 90 MM HG: CPT | Performed by: INTERNAL MEDICINE

## 2023-06-29 PROCEDURE — 3008F BODY MASS INDEX DOCD: CPT | Performed by: INTERNAL MEDICINE

## 2023-06-29 PROCEDURE — 99396 PREV VISIT EST AGE 40-64: CPT | Performed by: INTERNAL MEDICINE

## 2023-06-29 PROCEDURE — 90471 IMMUNIZATION ADMIN: CPT | Performed by: INTERNAL MEDICINE

## 2023-06-29 RX ORDER — TIRZEPATIDE 2.5 MG/.5ML
2.5 INJECTION, SOLUTION SUBCUTANEOUS WEEKLY
Qty: 2 ML | Refills: 0 | Status: SHIPPED | OUTPATIENT
Start: 2023-06-29

## 2023-06-30 ENCOUNTER — LAB ENCOUNTER (OUTPATIENT)
Dept: LAB | Age: 53
End: 2023-06-30
Attending: FAMILY MEDICINE
Payer: COMMERCIAL

## 2023-06-30 DIAGNOSIS — E66.01 CLASS 2 SEVERE OBESITY WITH SERIOUS COMORBIDITY AND BODY MASS INDEX (BMI) OF 35.0 TO 35.9 IN ADULT, UNSPECIFIED OBESITY TYPE (HCC): ICD-10-CM

## 2023-06-30 DIAGNOSIS — Z00.00 ROUTINE GENERAL MEDICAL EXAMINATION AT A HEALTH CARE FACILITY: ICD-10-CM

## 2023-06-30 LAB
ALBUMIN SERPL-MCNC: 3.5 G/DL (ref 3.4–5)
ALBUMIN/GLOB SERPL: 0.8 {RATIO} (ref 1–2)
ALP LIVER SERPL-CCNC: 76 U/L
ALT SERPL-CCNC: 15 U/L
ANION GAP SERPL CALC-SCNC: 0 MMOL/L (ref 0–18)
AST SERPL-CCNC: 12 U/L (ref 15–37)
BASOPHILS # BLD AUTO: 0.06 X10(3) UL (ref 0–0.2)
BASOPHILS NFR BLD AUTO: 1 %
BILIRUB SERPL-MCNC: 0.5 MG/DL (ref 0.1–2)
BUN BLD-MCNC: 12 MG/DL (ref 7–18)
CALCIUM BLD-MCNC: 8.4 MG/DL (ref 8.5–10.1)
CHLORIDE SERPL-SCNC: 111 MMOL/L (ref 98–112)
CHOLEST SERPL-MCNC: 208 MG/DL (ref ?–200)
CO2 SERPL-SCNC: 25 MMOL/L (ref 21–32)
CREAT BLD-MCNC: 0.98 MG/DL
EOSINOPHIL # BLD AUTO: 0.15 X10(3) UL (ref 0–0.7)
EOSINOPHIL NFR BLD AUTO: 2.5 %
ERYTHROCYTE [DISTWIDTH] IN BLOOD BY AUTOMATED COUNT: 12.7 %
FASTING PATIENT LIPID ANSWER: YES
FASTING STATUS PATIENT QL REPORTED: YES
GFR SERPLBLD BASED ON 1.73 SQ M-ARVRAT: 69 ML/MIN/1.73M2 (ref 60–?)
GLOBULIN PLAS-MCNC: 4.5 G/DL (ref 2.8–4.4)
GLUCOSE BLD-MCNC: 91 MG/DL (ref 70–99)
HCT VFR BLD AUTO: 35.9 %
HDLC SERPL-MCNC: 49 MG/DL (ref 40–59)
HGB BLD-MCNC: 11.7 G/DL
IMM GRANULOCYTES # BLD AUTO: 0.01 X10(3) UL (ref 0–1)
IMM GRANULOCYTES NFR BLD: 0.2 %
INSULIN SERPL-ACNC: 14.3 MU/L (ref 3–25)
IRON SATN MFR SERPL: 19 %
IRON SERPL-MCNC: 73 UG/DL
LDLC SERPL CALC-MCNC: 132 MG/DL (ref ?–100)
LYMPHOCYTES # BLD AUTO: 2.06 X10(3) UL (ref 1–4)
LYMPHOCYTES NFR BLD AUTO: 34.4 %
MCH RBC QN AUTO: 31.7 PG (ref 26–34)
MCHC RBC AUTO-ENTMCNC: 32.6 G/DL (ref 31–37)
MCV RBC AUTO: 97.3 FL
MONOCYTES # BLD AUTO: 0.38 X10(3) UL (ref 0.1–1)
MONOCYTES NFR BLD AUTO: 6.4 %
NEUTROPHILS # BLD AUTO: 3.32 X10 (3) UL (ref 1.5–7.7)
NEUTROPHILS # BLD AUTO: 3.32 X10(3) UL (ref 1.5–7.7)
NEUTROPHILS NFR BLD AUTO: 55.5 %
NONHDLC SERPL-MCNC: 159 MG/DL (ref ?–130)
OSMOLALITY SERPL CALC.SUM OF ELEC: 281 MOSM/KG (ref 275–295)
PLATELET # BLD AUTO: 307 10(3)UL (ref 150–450)
POTASSIUM SERPL-SCNC: 3.9 MMOL/L (ref 3.5–5.1)
PROT SERPL-MCNC: 8 G/DL (ref 6.4–8.2)
RBC # BLD AUTO: 3.69 X10(6)UL
SODIUM SERPL-SCNC: 136 MMOL/L (ref 136–145)
TIBC SERPL-MCNC: 386 UG/DL (ref 240–450)
TRANSFERRIN SERPL-MCNC: 259 MG/DL (ref 200–360)
TRIGL SERPL-MCNC: 153 MG/DL (ref 30–149)
TSI SER-ACNC: 1.2 MIU/ML (ref 0.36–3.74)
VLDLC SERPL CALC-MCNC: 28 MG/DL (ref 0–30)
WBC # BLD AUTO: 6 X10(3) UL (ref 4–11)

## 2023-06-30 PROCEDURE — 80061 LIPID PANEL: CPT | Performed by: INTERNAL MEDICINE

## 2023-06-30 PROCEDURE — 83525 ASSAY OF INSULIN: CPT | Performed by: INTERNAL MEDICINE

## 2023-06-30 PROCEDURE — 80050 GENERAL HEALTH PANEL: CPT | Performed by: INTERNAL MEDICINE

## 2023-06-30 PROCEDURE — 83540 ASSAY OF IRON: CPT | Performed by: INTERNAL MEDICINE

## 2023-06-30 PROCEDURE — 83550 IRON BINDING TEST: CPT | Performed by: INTERNAL MEDICINE

## 2023-07-10 ENCOUNTER — PATIENT MESSAGE (OUTPATIENT)
Dept: FAMILY MEDICINE CLINIC | Facility: CLINIC | Age: 53
End: 2023-07-10

## 2023-07-11 RX ORDER — LOSARTAN POTASSIUM 25 MG/1
25 TABLET ORAL DAILY
Qty: 90 TABLET | Refills: 3 | Status: SHIPPED | OUTPATIENT
Start: 2023-07-11

## 2023-07-11 NOTE — TELEPHONE ENCOUNTER
From: Flor Dean  To: Lamar Santos NP  Sent: 7/10/2023 4:08 PM CDT  Subject: Medication change    Hi  We had talked about switching the Losartan from half a pill to the full pill. With the current prescription [facility] can only fill a month supply so I can get 15 a month so I need a new prescription with the 30 for a month. THANKS! Also my insurance did cover some of the Mounjuro but my copay was $500 and it appeared the prescription was for quantity 2 (2 weeks?) so that would add up really fast so doesn't seem like something I can do for now.    Thanks [employer]

## 2023-07-11 NOTE — TELEPHONE ENCOUNTER
Medication Quantity Refills Start End   losartan 25 MG Oral Tab 90 tablet 2 3/9/2023    Sig:   TAKE 1/2 TABLET BY MOUTH DAILY       Last OV: 6/29/23    YONATHAN please see refill request and updated sig on rx, and update form pt re: [email or web address]    Approve/deny:

## 2023-12-11 ENCOUNTER — LAB ENCOUNTER (OUTPATIENT)
Dept: LAB | Age: 53
End: 2023-12-11
Attending: INTERNAL MEDICINE
Payer: COMMERCIAL

## 2023-12-11 DIAGNOSIS — K52.9 CHRONIC DIARRHEA: ICD-10-CM

## 2023-12-11 DIAGNOSIS — R10.9 RIGHT SIDED ABDOMINAL PAIN: ICD-10-CM

## 2023-12-11 LAB
DEPRECATED HBV CORE AB SER IA-ACNC: 18.8 NG/ML
FOLATE SERPL-MCNC: 14.1 NG/ML (ref 8.7–?)
IGA SERPL-MCNC: 31.9 MG/DL (ref 70–312)
VIT B12 SERPL-MCNC: 393 PG/ML (ref 193–986)

## 2023-12-11 PROCEDURE — 86364 TISS TRNSGLTMNASE EA IG CLAS: CPT

## 2023-12-11 PROCEDURE — 82607 VITAMIN B-12: CPT

## 2023-12-11 PROCEDURE — 82728 ASSAY OF FERRITIN: CPT

## 2023-12-11 PROCEDURE — 82784 ASSAY IGA/IGD/IGG/IGM EACH: CPT

## 2023-12-11 PROCEDURE — 36415 COLL VENOUS BLD VENIPUNCTURE: CPT

## 2023-12-11 PROCEDURE — 82746 ASSAY OF FOLIC ACID SERUM: CPT

## 2023-12-12 ENCOUNTER — LAB ENCOUNTER (OUTPATIENT)
Dept: LAB | Age: 53
End: 2023-12-12
Attending: INTERNAL MEDICINE
Payer: COMMERCIAL

## 2023-12-12 DIAGNOSIS — K52.9 CHRONIC DIARRHEA: ICD-10-CM

## 2023-12-12 LAB
CRYPTOSP AG STL QL IA: NEGATIVE
G LAMBLIA AG STL QL IA: NEGATIVE
TTG IGA SER-ACNC: <0.2 U/ML (ref ?–7)

## 2023-12-12 PROCEDURE — 87272 CRYPTOSPORIDIUM AG IF: CPT

## 2023-12-12 PROCEDURE — 87329 GIARDIA AG IA: CPT

## 2023-12-12 PROCEDURE — 82656 EL-1 FECAL QUAL/SEMIQ: CPT

## 2023-12-15 LAB — PANCREATIC ELAST FECAL: 143 UG ELAST./G

## 2024-01-02 ENCOUNTER — LAB ENCOUNTER (OUTPATIENT)
Dept: LAB | Age: 54
End: 2024-01-02
Attending: INTERNAL MEDICINE
Payer: COMMERCIAL

## 2024-01-02 DIAGNOSIS — R19.5 LOW FECAL ELASTASE LEVEL: ICD-10-CM

## 2024-01-02 PROCEDURE — 82710 FATS/LIPIDS FECES QUANT: CPT

## 2024-01-04 LAB
FAT FECAL QN: 5.2 G/24 HR
FAT FECAL QN: 5.2 G/24 HR
WEIGHT FECAL: 516 G
WEIGHT FECAL: 516 G

## 2024-02-02 PROBLEM — D12.3 BENIGN NEOPLASM OF TRANSVERSE COLON: Status: ACTIVE | Noted: 2024-02-02

## 2024-02-02 PROBLEM — R10.31 ABDOMINAL PAIN, RIGHT LOWER QUADRANT: Status: ACTIVE | Noted: 2024-02-02

## 2024-02-02 PROBLEM — R19.7 DIARRHEA, UNSPECIFIED: Status: ACTIVE | Noted: 2024-02-02

## 2024-02-02 PROBLEM — D12.2 BENIGN NEOPLASM OF ASCENDING COLON: Status: ACTIVE | Noted: 2024-02-02

## 2024-02-19 ENCOUNTER — HOSPITAL ENCOUNTER (OUTPATIENT)
Dept: MAMMOGRAPHY | Age: 54
Discharge: HOME | End: 2024-02-19
Attending: FAMILY MEDICINE
Payer: COMMERCIAL

## 2024-02-19 DIAGNOSIS — Z12.31 ENCOUNTER FOR SCREENING MAMMOGRAM FOR MALIGNANT NEOPLASM OF BREAST: ICD-10-CM

## 2024-02-19 DIAGNOSIS — Z12.31 BREAST CANCER SCREENING BY MAMMOGRAM: ICD-10-CM

## 2024-02-19 PROCEDURE — 77063 BREAST TOMOSYNTHESIS BI: CPT | Performed by: INTERNAL MEDICINE

## 2024-02-19 PROCEDURE — 77067 SCR MAMMO BI INCL CAD: CPT | Performed by: INTERNAL MEDICINE

## 2024-03-08 ENCOUNTER — PATIENT MESSAGE (OUTPATIENT)
Dept: FAMILY MEDICINE CLINIC | Facility: CLINIC | Age: 54
End: 2024-03-08

## 2024-03-08 DIAGNOSIS — Z23 NEED FOR MMR VACCINE: Primary | ICD-10-CM

## 2024-03-08 NOTE — TELEPHONE ENCOUNTER
From: Lindsey Dean  To: Lizet Cam  Sent: 3/8/2024 12:53 PM CST  Subject: Mmr booster     Hi my adult daughter's doctor is recommending that I receive a mmr shot booster since I will be with her during her immune suppression treatment due to my age and my association with little kids. Would you recommend testing for immunity or just getting a shot. Thank you!

## 2024-03-20 ENCOUNTER — NURSE ONLY (OUTPATIENT)
Dept: FAMILY MEDICINE CLINIC | Facility: CLINIC | Age: 54
End: 2024-03-20
Payer: COMMERCIAL

## 2024-03-20 PROCEDURE — 90707 MMR VACCINE SC: CPT | Performed by: INTERNAL MEDICINE

## 2024-03-20 PROCEDURE — 90471 IMMUNIZATION ADMIN: CPT | Performed by: INTERNAL MEDICINE

## 2024-03-28 ENCOUNTER — OFFICE VISIT (OUTPATIENT)
Facility: CLINIC | Age: 54
End: 2024-03-28
Payer: COMMERCIAL

## 2024-03-28 VITALS
BODY MASS INDEX: 34.83 KG/M2 | WEIGHT: 204 LBS | HEIGHT: 64 IN | SYSTOLIC BLOOD PRESSURE: 120 MMHG | HEART RATE: 77 BPM | DIASTOLIC BLOOD PRESSURE: 82 MMHG

## 2024-03-28 DIAGNOSIS — Z12.4 PAPANICOLAOU SMEAR FOR CERVICAL CANCER SCREENING: Primary | ICD-10-CM

## 2024-03-28 DIAGNOSIS — Z12.31 ENCOUNTER FOR SCREENING MAMMOGRAM FOR BREAST CANCER: ICD-10-CM

## 2024-03-28 PROCEDURE — 3008F BODY MASS INDEX DOCD: CPT | Performed by: OBSTETRICS & GYNECOLOGY

## 2024-03-28 PROCEDURE — 3079F DIAST BP 80-89 MM HG: CPT | Performed by: OBSTETRICS & GYNECOLOGY

## 2024-03-28 PROCEDURE — 88175 CYTOPATH C/V AUTO FLUID REDO: CPT | Performed by: OBSTETRICS & GYNECOLOGY

## 2024-03-28 PROCEDURE — 3074F SYST BP LT 130 MM HG: CPT | Performed by: OBSTETRICS & GYNECOLOGY

## 2024-03-28 PROCEDURE — 87624 HPV HI-RISK TYP POOLED RSLT: CPT | Performed by: OBSTETRICS & GYNECOLOGY

## 2024-03-28 PROCEDURE — 99386 PREV VISIT NEW AGE 40-64: CPT | Performed by: OBSTETRICS & GYNECOLOGY

## 2024-03-28 RX ORDER — TRANEXAMIC ACID 650 MG/1
1300 TABLET ORAL
Qty: 30 TABLET | Refills: 3 | Status: SHIPPED | OUTPATIENT
Start: 2024-03-28 | End: 2024-03-29

## 2024-03-28 NOTE — PROGRESS NOTES
Lindsey Dean is a 53 year old female  Patient's last menstrual period was 2024 (exact date).   Chief Complaint   Patient presents with    Wellness Visit     Last pap smear was 22, negative, just had mammogram 24, review results    .     Her periods are becoming irregular the longest she skipped is March.  Every other months she has 1 day where her periods are very heavy.  She has a history of anemia.  No bleeding between.  No hot flashes or night sweats.  She has 2 older sisters one of them is still menstruating at 55.  Blood work is done with her primary.  She has had a colonoscopy.  She recently had a mammogram no history of a DVT.  Lysteda was discussed.    OBSTETRICS HISTORY:  OB History    Para Term  AB Living   4 4       4   SAB IAB Ectopic Multiple Live Births           4      # Outcome Date GA Lbr Dayday/2nd Weight Sex Delivery Anes PTL Lv   4 Para      Caesarean   WALLACE   3 Para      NORMAL SPONT   WALLACE   2 Para      NORMAL SPONT   WALLACE   1 Para      Caesarean   WALLACE       GYNE HISTORY:  Periods irregular heavy    History   Sexual Activity    Sexual activity: Not on file        Hx Prior Abnormal Pap: No  Pap Date: 22  Pap Result Notes: negative        MEDICAL HISTORY:  Past Medical History:   Diagnosis Date    Abdominal pain     Anemia     Bloating     Diarrhea, unspecified     Worse in past 6 months    Easy bruising     Fatigue     Frequent urination     Glaucoma     Not diagnosed yet waiting for 6 month follow up but  feels it will be    Headache disorder     Heartburn     On and off for 5 years    Heavy menses     High cholesterol     Irregular bowel habits     Leaking of urine     Menses painful     Rash     Stool incontinence     On and off for 10 years, worse in past 3 months    Weight gain        SURGICAL HISTORY:  Past Surgical History:   Procedure Laterality Date    Colonoscopy      Egd         SOCIAL HISTORY:  Social History     Socioeconomic History    Marital  status:      Spouse name: Not on file    Number of children: Not on file    Years of education: Not on file    Highest education level: Not on file   Occupational History    Not on file   Tobacco Use    Smoking status: Never    Smokeless tobacco: Never   Substance and Sexual Activity    Alcohol use: No    Drug use: Never    Sexual activity: Not on file   Other Topics Concern     Service Not Asked    Blood Transfusions Not Asked    Caffeine Concern Yes    Occupational Exposure Not Asked    Hobby Hazards Not Asked    Sleep Concern Not Asked    Stress Concern Not Asked    Weight Concern Not Asked    Special Diet Not Asked    Back Care Not Asked    Exercise Yes    Bike Helmet Not Asked    Seat Belt Not Asked    Self-Exams Not Asked   Social History Narrative    Not on file     Social Determinants of Health     Financial Resource Strain: Not on file   Food Insecurity: Not on file   Transportation Needs: Not on file   Physical Activity: Not on file   Stress: Not on file   Social Connections: Not on file   Housing Stability: Not on file       FAMILY HISTORY:  Family History   Problem Relation Age of Onset    Asthma Mother     Other (kidney cancer) Father     Other (HTN) Father     Other (CABG) Father 53    Other (stent) Father     Heart Attack Father     Stroke Father     Colon Polyps Father     Juvenile Rheumatoid Arthritis Sister     Breast Cancer Maternal Grandmother 65    Ovarian Cancer Maternal Grandmother 60    Uterine Cancer Maternal Grandmother     Ovarian Cancer Paternal Grandmother 75    Colon Cancer Paternal Grandmother     Breast Cancer Paternal Grandmother 60 - 69       MEDICATIONS:    Current Outpatient Medications:     rifAXIMin (XIFAXAN) 550 MG Oral Tab, Take 1 tablet (550 mg total) by mouth 3 (three) times daily for 14 days., Disp: 42 tablet, Rfl: 0    Omeprazole 40 MG Oral Capsule Delayed Release, Take 1 capsule (40 mg total) by mouth daily., Disp: 90 capsule, Rfl: 3    losartan 25 MG Oral  Tab, Take 1 tablet (25 mg total) by mouth daily., Disp: 90 tablet, Rfl: 3    VITAMIN D OR, Take by mouth., Disp: , Rfl:     IRON OR, , Disp: , Rfl:     Cyanocobalamin (VITAMIN B 12 OR), Take by mouth., Disp: , Rfl:     ALLERGIES:    Allergies   Allergen Reactions    Aspirin HIVES    Iodine (Topical)     Radiology Contrast Iodinated Dyes HIVES    Lisinopril Coughing     ACE cough         Review of Systems:  Constitutional:  Denies fatigue, night sweats, hot flashes  Gastrointestinal:  denies heartburn, abdominal pain, diarrhea or constipation  Genitourinary:  denies dysuria, incontinence, abnormal vaginal discharge, vaginal itching  Skin/Breast:  Denies any breast pain, lumps, or discharge.   Neurological:  denies headaches, extremity weakness or numbness.      PHYSICAL EXAM:   Constitutional: well developed, well nourished  Head/Face: normocephalic  Neck/Thyroid: thyroid symmetric, no thyromegaly, no nodules, no adenopathy  Breast: normal without palpable masses, tenderness, asymmetry, nipple discharge, nipple retraction or skin changes  Abdomen:  soft, nontender, nondistended, no masses  Skin/Hair: no unusual rashes or bruises   Extremities: no edema, no cyanosis  Psychiatric:  Oriented to time, place, person and situation. Appropriate mood and affect    Pelvic Exam:  External Genitalia: normal appearance, hair distribution, and no lesions  Urethral Meatus:  normal in size, location, without lesions and prolapse  Bladder:  No fullness, masses or tenderness  Vagina:  Normal appearance without lesions, no abnormal discharge  Cervix:  Normal without tenderness on motion without lesions Pap.  Uterus: normal in size, contour, position, mobility, without tenderness  Adnexa: normal without masses or tenderness  Perineum: normal  Anus: no hemorroids     Assessment & Plan:  Lindsey was seen today for wellness visit.    Diagnoses and all orders for this visit:    Papanicolaou smear for cervical cancer screening  -     ThinPrep  PAP with HPV Reflex Request B; Future    Encounter for screening mammogram for breast cancer  -     [facility] DAMI[facility]N 2D+3D SCREENING BILAT (CPT=77067/02979); Future        Lysteda.

## 2024-04-03 LAB
.: NORMAL
.: NORMAL
HPV I/H RISK 1 DNA SPEC QL NAA+PROBE: NEGATIVE

## 2024-04-29 PROBLEM — K31.7 GASTRIC POLYP: Status: ACTIVE | Noted: 2024-04-29

## 2024-04-29 PROBLEM — R19.7 DIARRHEA: Status: ACTIVE | Noted: 2024-04-29

## 2024-06-24 ENCOUNTER — LAB ENCOUNTER (OUTPATIENT)
Dept: LAB | Age: 54
End: 2024-06-24
Attending: NURSE PRACTITIONER

## 2024-06-24 DIAGNOSIS — D50.9 IRON DEFICIENCY ANEMIA, UNSPECIFIED IRON DEFICIENCY ANEMIA TYPE: ICD-10-CM

## 2024-06-24 DIAGNOSIS — E53.8 VITAMIN B 12 DEFICIENCY: ICD-10-CM

## 2024-06-24 PROCEDURE — 36415 COLL VENOUS BLD VENIPUNCTURE: CPT

## 2024-06-24 PROCEDURE — 83516 IMMUNOASSAY NONANTIBODY: CPT

## 2024-06-24 PROCEDURE — 86340 INTRINSIC FACTOR ANTIBODY: CPT

## 2024-06-25 LAB — PARIETAL CELL AB: 2.2 UNITS

## 2024-06-26 LAB — INTRINSIC FACTOR ABS: 1 AU/ML

## 2024-07-08 ENCOUNTER — OFFICE VISIT (OUTPATIENT)
Dept: FAMILY MEDICINE CLINIC | Facility: CLINIC | Age: 54
End: 2024-07-08
Payer: COMMERCIAL

## 2024-07-08 VITALS
WEIGHT: 207 LBS | HEIGHT: 64 IN | OXYGEN SATURATION: 98 % | RESPIRATION RATE: 20 BRPM | DIASTOLIC BLOOD PRESSURE: 86 MMHG | HEART RATE: 82 BPM | BODY MASS INDEX: 35.34 KG/M2 | SYSTOLIC BLOOD PRESSURE: 144 MMHG

## 2024-07-08 DIAGNOSIS — R20.0 NUMBNESS AND TINGLING OF BOTH UPPER EXTREMITIES: ICD-10-CM

## 2024-07-08 DIAGNOSIS — R20.2 NUMBNESS AND TINGLING OF BOTH UPPER EXTREMITIES: ICD-10-CM

## 2024-07-08 DIAGNOSIS — Z00.00 ROUTINE GENERAL MEDICAL EXAMINATION AT A HEALTH CARE FACILITY: Primary | ICD-10-CM

## 2024-07-08 DIAGNOSIS — I10 PRIMARY HYPERTENSION: ICD-10-CM

## 2024-07-08 DIAGNOSIS — R20.2 NUMBNESS AND TINGLING OF BOTH LOWER EXTREMITIES: ICD-10-CM

## 2024-07-08 DIAGNOSIS — R20.0 NUMBNESS AND TINGLING OF BOTH LOWER EXTREMITIES: ICD-10-CM

## 2024-07-08 PROBLEM — M79.605 LEG PAIN, LEFT: Status: RESOLVED | Noted: 2017-08-10 | Resolved: 2024-07-08

## 2024-07-08 PROBLEM — R19.7 DIARRHEA, UNSPECIFIED: Status: RESOLVED | Noted: 2024-02-02 | Resolved: 2024-07-08

## 2024-07-08 PROCEDURE — 99396 PREV VISIT EST AGE 40-64: CPT | Performed by: INTERNAL MEDICINE

## 2024-07-08 PROCEDURE — 3008F BODY MASS INDEX DOCD: CPT | Performed by: INTERNAL MEDICINE

## 2024-07-08 PROCEDURE — 3077F SYST BP >= 140 MM HG: CPT | Performed by: INTERNAL MEDICINE

## 2024-07-08 PROCEDURE — 99213 OFFICE O/P EST LOW 20 MIN: CPT | Performed by: INTERNAL MEDICINE

## 2024-07-08 PROCEDURE — 3079F DIAST BP 80-89 MM HG: CPT | Performed by: INTERNAL MEDICINE

## 2024-07-08 RX ORDER — LOSARTAN POTASSIUM 50 MG/1
50 TABLET ORAL DAILY
Qty: 90 TABLET | Refills: 1 | Status: SHIPPED | OUTPATIENT
Start: 2024-07-08

## 2024-07-08 NOTE — PROGRESS NOTES
HPI:   Lindsey Dean is a 53 year old female who presents for a well woman exam. Symptoms:  periods were regular up until Jan, now Q 3 months .    Patient's last menstrual period was 03/03/2024 (exact date).  Previous pap: recent GYNE   Performs SBEs:yes  Colonoscopy: yes                   Followed up with GI for diarrhea.  Diagnosed with IBS. Fiber BID (psyillium) is helping. Had a reaction to Imodium OTC (rash). Follow up soon for frequent anemia. CT of lower GI.     C/o numbness in hands. Wakes at noc with symptoms. Varies to which hand is numb. Has worn a brace and it didn't help. Feet also going numb with Minh, not with anything else. Right foot dragged a few times, went away on its own.  Daughter with MS. Can be daily for days, then may go away for awhile. Holding a phone causes her hand to go numb.     Eye pressures borderline for glaucoma, being monitored  Current Outpatient Medications   Medication Sig Dispense Refill    diphenhydrAMINE HCl (BENADRYL ALLERGY EXTRA STR) 50 MG Oral Tab Take 1 tablet 1 hour before with Prednisone 50 mg. 1 tablet 0    Omeprazole 40 MG Oral Capsule Delayed Release Take 1 capsule (40 mg total) by mouth daily. 90 capsule 3    losartan 25 MG Oral Tab Take 1 tablet (25 mg total) by mouth daily. 90 tablet 3    VITAMIN D OR Take by mouth.      IRON OR       Cyanocobalamin (VITAMIN B 12 OR) Take by mouth.        Past Medical History:    Abdominal pain    Anemia    Bloating    Diarrhea, unspecified    Worse in past 6 months    Easy bruising    Fatigue    Frequent urination    Glaucoma    Not diagnosed yet waiting for 6 month follow up but dr feels it will be    Headache disorder    Heartburn    On and off for 5 years    Heavy menses    High cholesterol    Irregular bowel habits    Leaking of urine    Menses painful    Rash    Stool incontinence    On and off for 10 years, worse in past 3 months    Weight gain      Past Surgical History:   Procedure Laterality Date    Colonoscopy       Egd        Family History   Problem Relation Age of Onset    Asthma Mother     Other (kidney cancer) Father     Other (HTN) Father     Other (CABG) Father 53    Other (stent) Father     Heart Attack Father     Stroke Father     Colon Polyps Father     Juvenile Rheumatoid Arthritis Sister     Breast Cancer Maternal Grandmother 65    Ovarian Cancer Maternal Grandmother 60    Uterine Cancer Maternal Grandmother     Ovarian Cancer Paternal Grandmother 75    Colon Cancer Paternal Grandmother     Breast Cancer Paternal Grandmother 60 - 69      Social History:   Social History     Socioeconomic History    Marital status:    Tobacco Use    Smoking status: Never    Smokeless tobacco: Never   Substance and Sexual Activity    Alcohol use: No    Drug use: Never   Other Topics Concern    Caffeine Concern Yes    Exercise Yes     Exercise:  4-5 days a week aqua aerobics  Diet: watches minimally     REVIEW OF SYSTEMS:   GENERAL: feels well otherwise  SKIN: denies unusual skin lesions  HEENT:no vision or hearing changes; denies nasal congestion, sinus pain or sore throat  LUNGS: denies shortness of breath, chest heaviness or cough  CV: denies chest pain, pressure or palpitations  GI: denies abdominal pain; denies heartburn, frequent diarrhea or constipation  : denies dysuria, vaginal discharge or itching; denies vaginal dryness or dyspareunia   MS: denies back pain  NEURO: + migraine headaches since age 7 (pork, avocados/bananas - a lot of food triggers),  no dizziness  PSYCH: denies depression or anxiety  HEME: denies hx of anemia  ENDOCRINE: denies thyroid history, denies excessive thirst, denies significant weight change  ALL/ASTHMA: denies hx of  allergy or asthma    EXAM:   /86   Pulse 82   Resp 20   Ht 5' 4\" (1.626 m)   Wt 207 lb (93.9 kg)   LMP 03/03/2024 (Exact Date)   SpO2 98%   BMI 35.53 kg/m²       Body mass index is 35.53 kg/m².      GENERAL: pleasant female in no apparent distress  SKIN: warm and  dry  HEENT: atraumatic, normocephalic; PERRLA, conjunctiva clear, no vision changes, no auditory changes   NECK: supple, no adenopathy, no thyromegaly  BREASTS: no retractions, no suspicious masses, no discharge or axillary lymphadenopathy  LUNGS: clear to auscultation, easy breathing  CV: normal S1S2, RRR without murmur  GI: BSs present, no tenderness or organomegaly  :no adenopathy, no pelvic tenderness to palpation, pelvic exam deferred  MS: Елена, no bony deformities; upper extremities 5+ strength against resistance bilaterally, lower extremities 5+ strength against resistance bilaterally  EXT: no cyanosis, clubbing or edema  NEURO: A&Ox3, motor and sensory are grossly intact; good coordination, smooth movements, no resting tremor, facies symmetric, gait steady    ASSESSMENT AND PLAN:    1. Routine general medical examination at a health care facility  Reinforced routine SBEs. Discussed the benefits of routine exercise, a heart healthy diet and annual flu vaccines.  - CBC With Differential With Platelet; Future  - Comp Metabolic Panel (14); Future  - Lipid Panel; Future  - TSH W Reflex To Free T4; Future  - Vitamin B12; Future  - Vitamin D; Future  - Ferritin [E]; Future    2. Primary hypertension, uncontrolled  -Blood pressure rechecked in the office and was unchanged  -Increase ARB and continue checking blood pressures at home, goal blood pressures reviewed  - losartan 50 MG Oral Tab; Take 1 tablet (50 mg total) by mouth daily.  Dispense: 90 tablet; Refill: 1    3. Numbness and tingling of both lower extremities  -Check B12 and thyroid  - Neuro Referral - In Network    4. Numbness and tingling of both upper extremities  -Check B12 and thyroid  - Neuro Referral - In Network      Follow up 1 month blood pressure check.  Alese was given an opportunity to ask questions and verbalized understanding of care.          .

## 2024-07-11 ENCOUNTER — LAB ENCOUNTER (OUTPATIENT)
Dept: LAB | Age: 54
End: 2024-07-11
Attending: FAMILY MEDICINE
Payer: COMMERCIAL

## 2024-07-11 DIAGNOSIS — Z00.00 ROUTINE GENERAL MEDICAL EXAMINATION AT A HEALTH CARE FACILITY: ICD-10-CM

## 2024-07-11 LAB
ALBUMIN SERPL-MCNC: 4.1 G/DL (ref 3.2–4.8)
ALBUMIN/GLOB SERPL: 1 {RATIO} (ref 1–2)
ALP LIVER SERPL-CCNC: 82 U/L
ALT SERPL-CCNC: 19 U/L
ANION GAP SERPL CALC-SCNC: 3 MMOL/L (ref 0–18)
AST SERPL-CCNC: 18 U/L (ref ?–34)
BASOPHILS # BLD AUTO: 0.04 X10(3) UL (ref 0–0.2)
BASOPHILS NFR BLD AUTO: 0.6 %
BILIRUB SERPL-MCNC: 0.5 MG/DL (ref 0.3–1.2)
BUN BLD-MCNC: 10 MG/DL (ref 9–23)
BUN/CREAT SERPL: 11.9 (ref 10–20)
CALCIUM BLD-MCNC: 9.5 MG/DL (ref 8.7–10.4)
CHLORIDE SERPL-SCNC: 107 MMOL/L (ref 98–112)
CHOLEST SERPL-MCNC: 221 MG/DL (ref ?–200)
CO2 SERPL-SCNC: 28 MMOL/L (ref 21–32)
CREAT BLD-MCNC: 0.84 MG/DL
DEPRECATED HBV CORE AB SER IA-ACNC: 33.2 NG/ML
DEPRECATED RDW RBC AUTO: 45.4 FL (ref 35.1–46.3)
EGFRCR SERPLBLD CKD-EPI 2021: 83 ML/MIN/1.73M2 (ref 60–?)
EOSINOPHIL # BLD AUTO: 0.1 X10(3) UL (ref 0–0.7)
EOSINOPHIL NFR BLD AUTO: 1.6 %
ERYTHROCYTE [DISTWIDTH] IN BLOOD BY AUTOMATED COUNT: 12.7 % (ref 11–15)
FASTING PATIENT LIPID ANSWER: YES
FASTING STATUS PATIENT QL REPORTED: YES
GLOBULIN PLAS-MCNC: 4.3 G/DL (ref 2–3.5)
GLUCOSE BLD-MCNC: 91 MG/DL (ref 70–99)
HCT VFR BLD AUTO: 38.8 %
HDLC SERPL-MCNC: 52 MG/DL (ref 40–59)
HGB BLD-MCNC: 12.7 G/DL
IMM GRANULOCYTES # BLD AUTO: 0.01 X10(3) UL (ref 0–1)
IMM GRANULOCYTES NFR BLD: 0.2 %
LDLC SERPL CALC-MCNC: 142 MG/DL (ref ?–100)
LYMPHOCYTES # BLD AUTO: 1.67 X10(3) UL (ref 1–4)
LYMPHOCYTES NFR BLD AUTO: 26.1 %
MCH RBC QN AUTO: 31.9 PG (ref 26–34)
MCHC RBC AUTO-ENTMCNC: 32.7 G/DL (ref 31–37)
MCV RBC AUTO: 97.5 FL
MONOCYTES # BLD AUTO: 0.41 X10(3) UL (ref 0.1–1)
MONOCYTES NFR BLD AUTO: 6.4 %
NEUTROPHILS # BLD AUTO: 4.17 X10 (3) UL (ref 1.5–7.7)
NEUTROPHILS # BLD AUTO: 4.17 X10(3) UL (ref 1.5–7.7)
NEUTROPHILS NFR BLD AUTO: 65.1 %
NONHDLC SERPL-MCNC: 169 MG/DL (ref ?–130)
OSMOLALITY SERPL CALC.SUM OF ELEC: 285 MOSM/KG (ref 275–295)
PLATELET # BLD AUTO: 295 10(3)UL (ref 150–450)
POTASSIUM SERPL-SCNC: 4.4 MMOL/L (ref 3.5–5.1)
PROT SERPL-MCNC: 8.4 G/DL (ref 5.7–8.2)
RBC # BLD AUTO: 3.98 X10(6)UL
SODIUM SERPL-SCNC: 138 MMOL/L (ref 136–145)
TRIGL SERPL-MCNC: 151 MG/DL (ref 30–149)
TSI SER-ACNC: 1.24 MIU/ML (ref 0.55–4.78)
VIT B12 SERPL-MCNC: 489 PG/ML (ref 211–911)
VIT D+METAB SERPL-MCNC: 28.5 NG/ML (ref 30–100)
VLDLC SERPL CALC-MCNC: 28 MG/DL (ref 0–30)
WBC # BLD AUTO: 6.4 X10(3) UL (ref 4–11)

## 2024-07-11 PROCEDURE — 80061 LIPID PANEL: CPT | Performed by: INTERNAL MEDICINE

## 2024-07-11 PROCEDURE — 82607 VITAMIN B-12: CPT | Performed by: INTERNAL MEDICINE

## 2024-07-11 PROCEDURE — 80050 GENERAL HEALTH PANEL: CPT | Performed by: INTERNAL MEDICINE

## 2024-07-11 PROCEDURE — 82728 ASSAY OF FERRITIN: CPT | Performed by: INTERNAL MEDICINE

## 2024-07-11 PROCEDURE — 82306 VITAMIN D 25 HYDROXY: CPT | Performed by: INTERNAL MEDICINE

## 2024-07-15 ENCOUNTER — HOSPITAL ENCOUNTER (OUTPATIENT)
Dept: CT IMAGING | Facility: HOSPITAL | Age: 54
Discharge: HOME | End: 2024-07-15
Attending: NURSE PRACTITIONER
Payer: COMMERCIAL

## 2024-07-15 DIAGNOSIS — D50.9 IRON DEFICIENCY ANEMIA, UNSPECIFIED IRON DEFICIENCY ANEMIA TYPE: ICD-10-CM

## 2024-07-15 DIAGNOSIS — E53.8 VITAMIN B 12 DEFICIENCY: ICD-10-CM

## 2024-07-15 PROBLEM — R10.31 ABDOMINAL PAIN, RIGHT LOWER QUADRANT: Status: RESOLVED | Noted: 2024-02-02 | Resolved: 2024-07-15

## 2024-07-15 PROCEDURE — 74177 CT ABD & PELVIS W/CONTRAST: CPT | Performed by: NURSE PRACTITIONER

## 2024-07-17 ENCOUNTER — PATIENT MESSAGE (OUTPATIENT)
Dept: FAMILY MEDICINE CLINIC | Facility: CLINIC | Age: 54
End: 2024-07-17

## 2024-07-17 DIAGNOSIS — E78.00 HIGH CHOLESTEROL: Primary | ICD-10-CM

## 2024-07-18 RX ORDER — ROSUVASTATIN CALCIUM 5 MG/1
5 TABLET, COATED ORAL NIGHTLY
Qty: 90 TABLET | Refills: 0 | Status: SHIPPED | OUTPATIENT
Start: 2024-07-18

## 2024-07-18 NOTE — TELEPHONE ENCOUNTER
From: Lindsey Dean  To: Lizet Cam  Sent: 7/17/2024 3:34 PM CDT  Subject: Cholesterol medicne    Hi I am willing to trial a low dose cholesterol medicine. When would we do a follow up blood test? Thanks! Lindsey

## 2024-08-16 ENCOUNTER — OFFICE VISIT (OUTPATIENT)
Dept: NEUROLOGY | Facility: CLINIC | Age: 54
End: 2024-08-16
Payer: COMMERCIAL

## 2024-08-16 VITALS
BODY MASS INDEX: 36 KG/M2 | WEIGHT: 212.38 LBS | SYSTOLIC BLOOD PRESSURE: 132 MMHG | DIASTOLIC BLOOD PRESSURE: 70 MMHG | RESPIRATION RATE: 16 BRPM | HEART RATE: 78 BPM

## 2024-08-16 DIAGNOSIS — Z82.0 FAMILY HISTORY OF MS (MULTIPLE SCLEROSIS): ICD-10-CM

## 2024-08-16 DIAGNOSIS — R20.2 PARESTHESIA: Primary | ICD-10-CM

## 2024-08-16 DIAGNOSIS — R53.83 OTHER FATIGUE: ICD-10-CM

## 2024-08-16 PROCEDURE — 3075F SYST BP GE 130 - 139MM HG: CPT | Performed by: OTHER

## 2024-08-16 PROCEDURE — 99244 OFF/OP CNSLTJ NEW/EST MOD 40: CPT | Performed by: OTHER

## 2024-08-16 PROCEDURE — 3078F DIAST BP <80 MM HG: CPT | Performed by: OTHER

## 2024-08-16 NOTE — PATIENT INSTRUCTIONS
Refill policies:    Allow 2-3 business days for refills; controlled substances may take longer.  Contact your pharmacy at least 5 days prior to running out of medication and have them send an electronic request or submit request through the “request refill” option in your [employer] account.  Refills are not addressed on weekends; covering physicians do not authorize routine medications on weekends.  No narcotics or controlled substances are refilled after noon on Fridays or by on call physicians.  By law, narcotics must be electronically prescribed.  A 30 day supply with no refills is the maximum allowed.  If your prescription is due for a refill, you may be due for a follow up appointment.  To best provide you care, patients receiving routine medications need to be seen at least once a year.  Patients receiving narcotic/controlled substance medications need to be seen at least once every 3 months.  In the event that your preferred pharmacy does not have the requested medication in stock (e.g. Backordered), it is your responsibility to find another pharmacy that has the requested medication available.  We will gladly send a new prescription to that pharmacy at your request.    Scheduling Tests:    If your physician has ordered radiology tests such as MRI or CT scans, please contact Central Scheduling at [phone] right away to schedule the test.  Once scheduled, the [facility] Centralized Referral Team will work with your insurance carrier to obtain pre-certification or prior authorization.  Depending on your insurance carrier, approval may take 3-10 days.  It is highly recommended patients assure they have received an authorization before having a test performed.  If test is done without insurance authorization, patient may be responsible for the entire amount billed.      Precertification and Prior Authorizations:  If your physician has recommended that you have a procedure or additional testing performed the [facility]  Centralized Referral Team will contact your insurance carrier to obtain pre-certification or prior authorization.    You are strongly encouraged to contact your insurance carrier to verify that your procedure/test has been approved and is a COVERED benefit.  Although the [facility] Centralized Referral Team does its due diligence, the insurance carrier gives the disclaimer that \"Although the procedure is authorized, this does not guarantee payment.\"    Ultimately the patient is responsible for payment.   Thank you for your understanding in this matter.  Paperwork Completion:  If you require FMLA or disability paperwork for your recovery, please make sure to either drop it off or have it faxed to our office at [phone]. Be sure the form has your name and date of birth on it.  The form will be faxed to our Forms Department and they will complete it for you.  There is a 25$ fee for all forms that need to be filled out.  Please be aware there is a 10-14 day turnaround time.  You will need to sign a release of information (WILMAN) form if your paperwork does not come with one.  You may call the Forms Department with any questions at [phone].  Their fax number is [phone].

## 2024-08-16 NOTE — PROGRESS NOTES
Patient states bilateral hand and feet numbness and tingling. Patient denies burning sensation. Patient states cramping sensation. Patient states symptoms started 2 years ago on and off. Slight decrease in balance.

## 2024-08-16 NOTE — PROGRESS NOTES
[facility]    Date of consult: 8/16/2024    Assessment:    ICD-10-CM    1. Paresthesia  R20.2 MRI BRAIN (W+WO) (CPT=70553) [[id number]]      2. Family history of MS (multiple sclerosis)  Z82.0 MRI BRAIN (W+WO) (CPT=70553) [[id number]]      3. Other fatigue  R53.83 MRI BRAIN (W+WO) (CPT=70553) [[id number]]        To rule out MS    Plan:  MRI brain to eval for MS  PCP , GI to follow  See orders and medications filed with this encounter. The patient indicates understanding of these issues and agrees with the plan.  Discussed with patient regarding assessment, work up, care plan   RTC after MRI  Pt should go ER for any new or worsening symptoms and contact office   Subjective:   CC/Reason for consult: paresthesia, fatigue  Consult Requested by  Gume Rich MD    HPI: Lindsey Dean is a 53 year old female with past medical history as listed below presents here for initial evaluation of paresthesia. Patient states bilateral hand and feet numbness and tingling. Patient denies burning sensation. Patient states cramping sensation. Patient states symptoms started 2 years ago on and off. Slight decrease in balance. Daughter has MS.  Pt is concerned if she has MS too. + fatigue and anemic, she is seeing GI for work  up.     History/Other:   REVIEW OF SYSTEMS:  A comprehensive 14-point system was reviewed. Pertinent positives and negatives are noted in HPI.       Current Outpatient Medications:     losartan 50 MG Oral Tab, Take 1 tablet (50 mg total) by mouth daily., Disp: 90 tablet, Rfl: 1    Omeprazole 40 MG Oral Capsule Delayed Release, Take 1 capsule (40 mg total) by mouth daily., Disp: 90 capsule, Rfl: 3    VITAMIN D OR, Take by mouth., Disp: , Rfl:     IRON OR, , Disp: , Rfl:     Cyanocobalamin (VITAMIN B 12 OR), Take by mouth., Disp: , Rfl:     rosuvastatin 5 MG Oral Tab, Take 1 tablet (5 mg total) by mouth nightly. (Patient not taking: Reported on 8/16/2024), Disp: 90 tablet, Rfl:  0  Allergies:  Allergies   Allergen Reactions    Radiology Contrast Iodinated Dyes HIVES    Aspirin HIVES    Iodine (Topical)     Lisinopril Coughing     ACE cough     Past Medical History:    Abdominal pain    Anemia    Bloating    Diarrhea, unspecified    Worse in past 6 months    Easy bruising    Fatigue    Frequent urination    Glaucoma    Not diagnosed yet waiting for 6 month follow up but  feels it will be    Headache disorder    Heartburn    On and off for 5 years    Heavy menses    High cholesterol    Irregular bowel habits    Leaking of urine    Menses painful    Rash    Stool incontinence    On and off for 10 years, worse in past 3 months    Weight gain     Past Surgical History:   Procedure Laterality Date    Colonoscopy      Egd       Social History:  Social History     Tobacco Use    Smoking status: Never    Smokeless tobacco: Never   Substance Use Topics    Alcohol use: No     Family History   Problem Relation Age of Onset    Asthma Mother     Other (kidney cancer) Father     Other (HTN) Father     Other (CABG) Father 53    Other (stent) Father     Heart Attack Father     Stroke Father     Colon Polyps Father     Juvenile Rheumatoid Arthritis Sister     Breast Cancer Maternal Grandmother 65    Ovarian Cancer Maternal Grandmother 60    Uterine Cancer Maternal Grandmother     Ovarian Cancer Paternal Grandmother 75    Colon Cancer Paternal Grandmother     Breast Cancer Paternal Grandmother 60 - 69      Objective:   Physical Examination:  /70   Pulse 78   Resp 16   Wt 212 lb 6.4 oz (96.3 kg)   LMP 03/03/2024 (Exact Date)   BMI 36.46 kg/m²   General: Awake and alert; in no acute distress  HEENT: Eye sclerae are anicteric; scalp is atraumatic  Neck: Supple  Cardiac: Regular rate and regular rhythm  Lungs: Clear   Abdomen:  non-tender  Extremities: No clubbing or cyanosis; moves extremities   Psychiatric: Normal mood and affect; answers questions appropriately  Dermatologic: No rashes; no  edema  Neurological Examination:  Language: normal   Speech: no dysarthria  CN: II-XII intact  Motor strength: 5/5 all extremities  Tone: normal  DTRs: 2+ symmetric  Coordination: Normal FTN  Sensory: symmetric   Gait: nl    Data Reviewed on 8/16/2024  Notes Reviewed on 8/16/2024  Labs Reviewed  on 8/16/2024    Britney \"Rayshawn\" MD [place]   Neurology  [facility]  8/16/2024, 10:17 AM  Consultation Report: being sent/fax/route to requesting provider   CC: Gume Rich MD

## 2024-09-19 ENCOUNTER — HOSPITAL ENCOUNTER (OUTPATIENT)
Dept: MRI IMAGING | Facility: HOSPITAL | Age: 54
Discharge: HOME | End: 2024-09-19
Attending: Other
Payer: COMMERCIAL

## 2024-09-19 DIAGNOSIS — R53.83 OTHER FATIGUE: ICD-10-CM

## 2024-09-19 DIAGNOSIS — R20.2 PARESTHESIA: ICD-10-CM

## 2024-09-19 DIAGNOSIS — Z82.0 FAMILY HISTORY OF MS (MULTIPLE SCLEROSIS): ICD-10-CM

## 2024-09-19 PROCEDURE — 70553 MRI BRAIN STEM W/O & W/DYE: CPT | Performed by: OTHER

## 2024-09-19 PROCEDURE — A9575 INJ GADOTERATE MEGLUMI 0.1ML: HCPCS | Performed by: OTHER

## 2024-09-19 RX ORDER — GADOTERATE MEGLUMINE 376.9 MG/ML
20 INJECTION INTRAVENOUS
Status: COMPLETED | OUTPATIENT
Start: 2024-09-19 | End: 2024-09-19

## 2024-09-19 RX ADMIN — GADOTERATE MEGLUMINE 18 ML: 376.9 INJECTION INTRAVENOUS at 07:38:00

## 2024-10-28 PROBLEM — C44.91 NODULAR BASAL CELL CARCINOMA: Status: ACTIVE | Noted: 2024-10-28

## 2024-11-04 NOTE — PROGRESS NOTES
[facility] Note    Visit Diagnosis:  1. Anemia, unspecified type        History of Present Illness: 54F was referred by Dr. Trinh for anemia. Has a history of iron deficiency since 2014 (ferritin 3-33). She follows with GI for chronic GI issues. She has a history of a GI ulcer in the past. EGD/Colon from 2024-duodenitis, 3 cm hiatal hernia, multiple colon polyps with a 3 year recall. She is on a PPI. CTE from July 2024 negative aside from diverticulosis. She also had a normal VCE in 2024. She has been on a PPI for a year. No IV iron or blood transfusions in the past. She has been on oral for several years without improvement. This has also resulted in constipation. She still has menses. Previously they have been heavy but they have lightened in the past 1-2 years. Dad with kidney cancer. Grandmother with leukoenia, uterine and breast. Other grandmother with colon cancer.     Review of Systems: 12 Point ROS was completed and pertinent positives are in the HPI    Medications Ordered Prior to Encounter[1]  Past Medical History:    Abdominal pain    Anemia    Bloating    Diarrhea, unspecified    Worse in past 6 months    Dizziness    Easy bruising    Fatigue    Frequent urination    Glaucoma    Not diagnosed yet waiting for 6 month follow up but dr feels it will be    Headache disorder    Heartburn    On and off for 5 years    Heavy menses    High cholesterol    Irregular bowel habits    Leaking of urine    Leg swelling    Menses painful    Rash    Stool incontinence    On and off for 10 years, worse in past 3 months    Weight gain     Past Surgical History:   Procedure Laterality Date    Colonoscopy      Egd       Social History     Socioeconomic History    Marital status:    Tobacco Use    Smoking status: Never    Smokeless tobacco: Never   Substance and Sexual Activity    Alcohol use: No    Drug use: Never      Family History   Problem Relation Age of Onset    Other (kidney cancer)  Father     Other (HTN) Father     Other (CABG) Father 53    Other (stent) Father     Heart Attack Father     Stroke Father     Colon Polyps Father     Asthma Mother     Cancer Maternal Grandmother         leukemia    Breast Cancer Maternal Grandmother 65    Ovarian Cancer Maternal Grandmother 60    Uterine Cancer Maternal Grandmother     Ovarian Cancer Paternal Grandmother 75    Colon Cancer Paternal Grandmother     Breast Cancer Paternal Grandmother 60 - 69    Juvenile Rheumatoid Arthritis Sister        Physical Exam  Height: 162.6 cm (5' 4.02\") (11/05 1323)  Weight: 97.1 kg (214 lb) (11/05 1323)  BSA (Calculated - sq m): 2.01 sq meters (11/05 1323)  Pulse: 74 (11/05 1323)  BP: 145/85 (11/05 1323)  Temp: 97.7 °F (36.5 °C) (11/05 1323)  Do Not Use - Resp Rate: --  SpO2: 98 % (11/05 1323)    General: NAD, AOX3  HEENT: clear op, mmm, no jvd, no scleral icterus  CV: RR  Extremities: No edema   Lungs: no increased work of breathing  Abd: non distended   Neuro: CN: II-XII grossly intact      Results:  Lab Results   Component Value Date    WBC 6.4 07/11/2024    HGB 12.7 07/11/2024    HCT 38.8 07/11/2024    MCV 97.5 07/11/2024    .0 07/11/2024     Lab Results   Component Value Date     07/11/2024    K 4.4 07/11/2024    CO2 28.0 07/11/2024     07/11/2024    BUN 10 07/11/2024    ALB 4.1 07/11/2024       No results found for: \"LDH\"    Radiology: reviewed     Assessment and Plan:  Iron Deficiency   -Has a history of iron deficiency since 2014 (ferritin 3-33)  -Chronic iron deficiency is likely related to previous history of menses, PPI use impaired absorption  -Repeat CBC, FE studies and check SPEP  -Normal B12, Folate   -Plan for IV InFED x 1. Risks and benefits discussed   -Plan to repeat labs in 3 months    MDM: high-iron deficiency anemia     [facility]         [1]   Current Outpatient Medications on File Prior to Visit   Medication Sig Dispense Refill     Omeprazole 40 MG Oral Capsule Delayed Release Take 1 capsule (40 mg total) by mouth daily. 90 capsule 3    losartan 50 MG Oral Tab Take 1 tablet (50 mg total) by mouth daily. 90 tablet 1    VITAMIN D OR Take by mouth.      IRON OR       Cyanocobalamin (VITAMIN B 12 OR) Take by mouth.      rosuvastatin 5 MG Oral Tab Take 1 tablet (5 mg total) by mouth nightly. (Patient not taking: Reported on 11/5/2024) 90 tablet 0     No current facility-administered medications on file prior to visit.

## 2024-11-05 ENCOUNTER — OFFICE VISIT (OUTPATIENT)
Dept: HEMATOLOGY/ONCOLOGY | Facility: HOSPITAL | Age: 54
End: 2024-11-05
Attending: INTERNAL MEDICINE
Payer: COMMERCIAL

## 2024-11-05 VITALS
TEMPERATURE: 98 F | HEIGHT: 64.02 IN | OXYGEN SATURATION: 98 % | HEART RATE: 74 BPM | SYSTOLIC BLOOD PRESSURE: 145 MMHG | WEIGHT: 214 LBS | BODY MASS INDEX: 36.54 KG/M2 | DIASTOLIC BLOOD PRESSURE: 85 MMHG | RESPIRATION RATE: 16 BRPM

## 2024-11-05 DIAGNOSIS — D64.9 ANEMIA, UNSPECIFIED TYPE: Primary | ICD-10-CM

## 2024-11-05 PROCEDURE — 99245 OFF/OP CONSLTJ NEW/EST HI 55: CPT | Performed by: INTERNAL MEDICINE

## 2024-11-05 NOTE — PROGRESS NOTES
Patient here as a new consult for anemia. Energy levels are poor.  Has some dyspnea. Up to date with GI states she still has some menstrual cycles, but that they are light. On oral iron daily.

## 2024-11-12 ENCOUNTER — TELEPHONE (OUTPATIENT)
Dept: HEMATOLOGY/ONCOLOGY | Facility: HOSPITAL | Age: 54
End: 2024-11-12

## 2024-11-12 NOTE — TELEPHONE ENCOUNTER
I attempted to call patient. 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 We will need to repeat her CBC, CMP, Fe studies in 3 months to ensure that her Hb has normalized. We will need to consider a 24 hour urine, immunoglobulins, LDH, B2M, imaging and bone marrow biopsy. Will request a follow up visit.

## 2024-11-15 ENCOUNTER — OFFICE VISIT (OUTPATIENT)
Dept: HEMATOLOGY/ONCOLOGY | Facility: HOSPITAL | Age: 54
End: 2024-11-15
Attending: INTERNAL MEDICINE
Payer: COMMERCIAL

## 2024-11-15 VITALS
HEART RATE: 78 BPM | OXYGEN SATURATION: 93 % | TEMPERATURE: 98 F | HEIGHT: 64.02 IN | SYSTOLIC BLOOD PRESSURE: 127 MMHG | DIASTOLIC BLOOD PRESSURE: 86 MMHG | RESPIRATION RATE: 16 BRPM | BODY MASS INDEX: 36.5 KG/M2 | WEIGHT: 213.81 LBS

## 2024-11-15 DIAGNOSIS — D64.9 ANEMIA, UNSPECIFIED TYPE: Primary | ICD-10-CM

## 2024-11-15 PROCEDURE — 96365 THER/PROPH/DIAG IV INF INIT: CPT

## 2024-11-15 NOTE — PROGRESS NOTES
Education Record    Learner:  Patient    Disease / Diagnosis: anemia    Barriers / Limitations:  None   Comments:    Method:  Discussion   Comments:    General Topics:  Medication, Side effects and symptom management, Plan of care reviewed, and Fall risk and prevention   Comments:    Outcome:  Shows understanding   Comments:    Infed iron test dose and full dose tolerated well. Pt has MD f/u scheduled for 12/2/24. Pt discharged ambulatory in stable condition upon completion.

## 2024-11-30 NOTE — PROGRESS NOTES
[facility]    Visit Diagnosis:  1. Abnormal SPEP          History of Present Illness: 54F was referred by Dr. Trinh for anemia. She is noted to have an IgG Lambda M spike and iron deficiency anemia.     -Initial visit 11/2024: She has a history of iron deficiency since 2014 (ferritin 3-33). She follows with GI for chronic GI issues. She has a history of a GI ulcer in the past. EGD/Colon from 2024-duodenitis, 3 cm hiatal hernia, multiple colon polyps with a 3 year recall. She is on a PPI. CTE from July 2024 negative aside from diverticulosis. She also had a normal VCE in 2024. She has been on a PPI for a year. No IV iron or blood transfusions in the past. She has been on oral for several years without improvement. This has also resulted in constipation. She still has menses. Previously they have been heavy but they have lightened in the past 1-2 years. Dad with kidney cancer. Grandmother with leukoenia, uterine and breast. Other grandmother with colon cancer. Labs on this day showed an IgG Lambda M spike of 2.13, Lambda 14.1, Kappa 0.577, Ratio 24.5    -IV InFED 11/15/24    Interval History  -Labs reviewed. Abnormal SPEP as above  -Feels better after Iron     Review of Systems: 12 Point ROS was completed and pertinent positives are in the HPI    Medications Ordered Prior to Encounter[1]  Past Medical History:    Abdominal pain    Anemia    Bloating    Diarrhea, unspecified    Worse in past 6 months    Dizziness    Easy bruising    Fatigue    Frequent urination    Glaucoma    Not diagnosed yet waiting for 6 month follow up but  feels it will be    Headache disorder    Heartburn    On and off for 5 years    Heavy menses    High cholesterol    Irregular bowel habits    Leaking of urine    Leg swelling    Menses painful    Rash    Stool incontinence    On and off for 10 years, worse in past 3 months    Weight gain     Past Surgical History:   Procedure Laterality Date    Colonoscopy       Egd       Social History     Socioeconomic History    Marital status:    Tobacco Use    Smoking status: Never    Smokeless tobacco: Never   Substance and Sexual Activity    Alcohol use: No    Drug use: Never      Family History   Problem Relation Age of Onset    Other (kidney cancer) Father     Other (HTN) Father     Other (CABG) Father 53    Other (stent) Father     Heart Attack Father     Stroke Father     Colon Polyps Father     Asthma Mother     Cancer Maternal Grandmother         leukemia    Breast Cancer Maternal Grandmother 65    Ovarian Cancer Maternal Grandmother 60    Uterine Cancer Maternal Grandmother     Ovarian Cancer Paternal Grandmother 75    Colon Cancer Paternal Grandmother     Breast Cancer Paternal Grandmother 60 - 69    Juvenile Rheumatoid Arthritis Sister        Physical Exam  Height: 162.6 cm (5' 4.02\") (12/02 1541)  Weight: 97.5 kg (215 lb) (12/02 1541)  BSA (Calculated - sq m): 2.02 sq meters (12/02 1541)  Pulse: 86 (12/02 1541)  BP: 151/83 (12/02 1541)  Temp: 97.9 °F (36.6 °C) (12/02 1541)  Do Not Use - Resp Rate: --  SpO2: 99 % (12/02 1541)    General: NAD, AOX3  HEENT: clear op, mmm, no jvd, no scleral icterus  CV: RR  Extremities: No edema   Lungs: no increased work of breathing  Abd: non distended   Neuro: CN: II-XII grossly intact      Results:  Lab Results   Component Value Date    WBC 6.2 11/05/2024    HGB 11.9 (L) 11/05/2024    HCT 36.3 11/05/2024    MCV 97.3 11/05/2024    .0 11/05/2024     Lab Results   Component Value Date     07/11/2024    K 4.4 07/11/2024    CO2 28.0 07/11/2024     07/11/2024    BUN 10 07/11/2024    ALB 4.11 11/05/2024       No results found for: \"LDH\"    Radiology: reviewed     Assessment and Plan:  Iron Deficiency   -Has a history of iron deficiency since 2014 (ferritin 3-33)  -Chronic iron deficiency is likely related to previous history of menses, PPI use impaired absorption. She is up to date with GI  -s/p IV InFED  -Repeat labs in  02/2025  -Normal B12, Folate     IgG Lambda M spike  -M spike 2.1 and SFLC ratio 24  -Check immunoglobulins, LDH, B2M and CBC on day of bone marrow  -PET/CT ordered   -Will request a 24 hour urine and Bone marrow biopsy. She may need to follow up with one of our hematology specialist pending results     TRAN Nelson MD  [facility]         [1]   Current Outpatient Medications on File Prior to Visit   Medication Sig Dispense Refill    Omeprazole 40 MG Oral Capsule Delayed Release Take 1 capsule (40 mg total) by mouth daily. 90 capsule 3    rosuvastatin 5 MG Oral Tab Take 1 tablet (5 mg total) by mouth nightly. (Patient not taking: Reported on 11/5/2024) 90 tablet 0    losartan 50 MG Oral Tab Take 1 tablet (50 mg total) by mouth daily. 90 tablet 1    VITAMIN D OR Take by mouth.      IRON OR       Cyanocobalamin (VITAMIN B 12 OR) Take by mouth.       Current Facility-Administered Medications on File Prior to Visit   Medication Dose Route Frequency Provider Last Rate Last Admin    [COMPLETED] iron dextran (Infed) 25 mg in sodium chloride 0.9% PF 10 mL IV syringe (test dose)  25 mg Intravenous Once Sonya Nelson MD   25 mg at 11/15/24 1310    [COMPLETED] iron dextran (Infed) 975 mg in sodium chloride 0.9% 269.5 mL IVPB  975 mg Intravenous Once Sonya Nelson MD   Stopped at 11/15/24 0357

## 2024-12-02 ENCOUNTER — OFFICE VISIT (OUTPATIENT)
Dept: HEMATOLOGY/ONCOLOGY | Age: 54
End: 2024-12-02
Attending: INTERNAL MEDICINE
Payer: COMMERCIAL

## 2024-12-02 VITALS
HEART RATE: 86 BPM | TEMPERATURE: 98 F | OXYGEN SATURATION: 99 % | HEIGHT: 64.02 IN | DIASTOLIC BLOOD PRESSURE: 83 MMHG | WEIGHT: 215 LBS | SYSTOLIC BLOOD PRESSURE: 151 MMHG | RESPIRATION RATE: 18 BRPM | BODY MASS INDEX: 36.7 KG/M2

## 2024-12-02 DIAGNOSIS — R77.8 ABNORMAL SPEP: Primary | ICD-10-CM

## 2024-12-02 PROCEDURE — 99215 OFFICE O/P EST HI 40 MIN: CPT | Performed by: INTERNAL MEDICINE

## 2024-12-02 NOTE — PATIENT INSTRUCTIONS
1.24 hour urine  2.PET scan ([id number])  3.Bone marrow biopsy with labs same day (notify RN when appointment is scheduled) [phone] or [facility]

## 2024-12-10 ENCOUNTER — TELEPHONE (OUTPATIENT)
Dept: HEMATOLOGY/ONCOLOGY | Facility: HOSPITAL | Age: 54
End: 2024-12-10

## 2024-12-10 ENCOUNTER — APPOINTMENT (OUTPATIENT)
Dept: LAB | Facility: HOSPITAL | Age: 54
End: 2024-12-10
Attending: INTERNAL MEDICINE
Payer: COMMERCIAL

## 2024-12-10 ENCOUNTER — HOSPITAL ENCOUNTER (OUTPATIENT)
Dept: CT IMAGING | Facility: HOSPITAL | Age: 54
Discharge: HOME | End: 2024-12-10
Attending: INTERNAL MEDICINE
Payer: COMMERCIAL

## 2024-12-10 NOTE — TELEPHONE ENCOUNTER
Received call from PET department asking if Dr. Nelson would like skull to toe PET instead. Spoke with MD, will keep orders as is.

## 2024-12-12 ENCOUNTER — HOSPITAL ENCOUNTER (OUTPATIENT)
Dept: NUCLEAR MEDICINE | Facility: HOSPITAL | Age: 54
Discharge: HOME | End: 2024-12-12
Attending: INTERNAL MEDICINE
Payer: COMMERCIAL

## 2024-12-12 DIAGNOSIS — R77.8 ABNORMAL SPEP: ICD-10-CM

## 2024-12-12 LAB — GLUCOSE BLD-MCNC: 93 MG/DL (ref 70–99)

## 2024-12-12 PROCEDURE — 82962 GLUCOSE BLOOD TEST: CPT

## 2024-12-12 PROCEDURE — 78815 PET IMAGE W/CT SKULL-THIGH: CPT | Performed by: INTERNAL MEDICINE

## 2024-12-15 DIAGNOSIS — I10 PRIMARY HYPERTENSION: ICD-10-CM

## 2024-12-16 ENCOUNTER — OFFICE VISIT (OUTPATIENT)
Dept: FAMILY MEDICINE CLINIC | Facility: CLINIC | Age: 54
End: 2024-12-16
Payer: COMMERCIAL

## 2024-12-16 VITALS
DIASTOLIC BLOOD PRESSURE: 78 MMHG | SYSTOLIC BLOOD PRESSURE: 132 MMHG | HEIGHT: 64 IN | BODY MASS INDEX: 36.54 KG/M2 | WEIGHT: 214 LBS

## 2024-12-16 DIAGNOSIS — E78.00 HIGH CHOLESTEROL: ICD-10-CM

## 2024-12-16 DIAGNOSIS — I10 PRIMARY HYPERTENSION: ICD-10-CM

## 2024-12-16 DIAGNOSIS — D89.2 SERUM GAMMA GLOBULIN INCREASED: Primary | ICD-10-CM

## 2024-12-16 RX ORDER — LOSARTAN POTASSIUM 50 MG/1
50 TABLET ORAL DAILY
Qty: 90 TABLET | Refills: 1 | Status: SHIPPED | OUTPATIENT
Start: 2024-12-16

## 2024-12-16 NOTE — TELEPHONE ENCOUNTER
A refill request was received for:  Requested Prescriptions     Pending Prescriptions Disp Refills    LOSARTAN 50 MG Oral Tab [Pharmacy Med Name: LOSARTAN 50MG TABLETS] 90 tablet 1     Sig: TAKE 1 TABLET(50 MG) BY MOUTH DAILY       Last refill date:07/08/24       Last office visit: 12/16/24      Future Appointments   Date Time Provider Department Center   [facility]17[facility]2024  [facility]   12/17/2024 10:15 AM  CT MAIN 1 [facility]

## 2024-12-16 NOTE — PROGRESS NOTES
Subjective:   Patient ID: Lindsey Dean is a 54 year old female.    Having bone marrow biopsy with radiology under anesthesia on 24.  Had tolerated anesthesia in past with colonoscopy, EGD, , without any issues.  Needing bone marrow biopsy for abnormal kappa/lambda    ROS is otherwise stable and negative.      History/Other:   Review of Systems   All other systems reviewed and are negative.    Current Outpatient Medications   Medication Sig Dispense Refill    Omeprazole 40 MG Oral Capsule Delayed Release Take 1 capsule (40 mg total) by mouth daily. 90 capsule 3    losartan 50 MG Oral Tab Take 1 tablet (50 mg total) by mouth daily. 90 tablet 1    VITAMIN D OR Take by mouth.      Cyanocobalamin (VITAMIN B 12 OR) Take by mouth.       Allergies:Allergies[1]    Objective:   Physical Exam  Vitals reviewed.   Constitutional:       General: She is not in acute distress.     Appearance: She is well-developed. She is not diaphoretic.   HENT:      Right Ear: External ear normal.      Left Ear: External ear normal.      Nose: Nose normal.      Mouth/Throat:      Pharynx: No oropharyngeal exudate.   Eyes:      General: No scleral icterus.        Right eye: No discharge.         Left eye: No discharge.      Conjunctiva/sclera: Conjunctivae normal.   Neck:      Thyroid: No thyromegaly.   Cardiovascular:      Rate and Rhythm: Normal rate and regular rhythm.      Heart sounds: Normal heart sounds.   Pulmonary:      Effort: Pulmonary effort is normal. No respiratory distress.      Breath sounds: Normal breath sounds. No wheezing or rales.   Abdominal:      General: Bowel sounds are normal. There is no distension.      Palpations: Abdomen is soft. There is no mass.      Tenderness: There is no abdominal tenderness. There is no guarding or rebound.   Musculoskeletal:         General: No tenderness. Normal range of motion.      Cervical back: Normal range of motion and neck supple.   Lymphadenopathy:      Cervical:  No cervical adenopathy.   Skin:     General: Skin is warm and dry.      Findings: No erythema or rash.   Neurological:      Mental Status: She is alert and oriented to person, place, and time.      Motor: No abnormal muscle tone.      Deep Tendon Reflexes: Reflexes are normal and symmetric.   Psychiatric:         Behavior: Behavior normal.         Thought Content: Thought content normal.         Judgment: Judgment normal.         Assessment & Plan:   1. Serum gamma globulin increased    2. Primary hypertension    3. High cholesterol      Stable.  Low risk.  Cleared for surgery      Meds This Visit:  Requested Prescriptions      No prescriptions requested or ordered in this encounter       Imaging & Referrals:  None         [1]   Allergies  Allergen Reactions    Radiology Contrast Iodinated Dyes HIVES    Aspirin HIVES    Iodine (Topical)     Lisinopril Coughing     ACE cough

## 2024-12-17 ENCOUNTER — NURSE ONLY (OUTPATIENT)
Dept: LAB | Facility: HOSPITAL | Age: 54
End: 2024-12-17
Attending: INTERNAL MEDICINE
Payer: COMMERCIAL

## 2024-12-17 ENCOUNTER — HOSPITAL ENCOUNTER (OUTPATIENT)
Dept: CT IMAGING | Facility: HOSPITAL | Age: 54
Discharge: HOME | End: 2024-12-17
Attending: INTERNAL MEDICINE
Payer: COMMERCIAL

## 2024-12-17 VITALS
WEIGHT: 215 LBS | SYSTOLIC BLOOD PRESSURE: 142 MMHG | HEIGHT: 64 IN | RESPIRATION RATE: 16 BRPM | BODY MASS INDEX: 36.7 KG/M2 | DIASTOLIC BLOOD PRESSURE: 71 MMHG | TEMPERATURE: 98 F | HEART RATE: 70 BPM | OXYGEN SATURATION: 100 %

## 2024-12-17 DIAGNOSIS — R77.8 ABNORMAL SPEP: ICD-10-CM

## 2024-12-17 DIAGNOSIS — R77.8 ABNORMAL SPEP: Primary | ICD-10-CM

## 2024-12-17 LAB
ERYTHROCYTE [DISTWIDTH] IN BLOOD BY AUTOMATED COUNT: 12.7 %
HCT VFR BLD AUTO: 37.2 %
HGB BLD-MCNC: 12.4 G/DL
INR BLD: 1.01 (ref 0.8–1.2)
MCH RBC QN AUTO: 33.2 PG (ref 26–34)
MCHC RBC AUTO-ENTMCNC: 33.3 G/DL (ref 31–37)
MCV RBC AUTO: 99.5 FL
PLATELET # BLD AUTO: 253 10(3)UL (ref 150–450)
PROTHROMBIN TIME: 13.4 SECONDS (ref 11.6–14.8)
RBC # BLD AUTO: 3.74 X10(6)UL
WBC # BLD AUTO: 6.6 X10(3) UL (ref 4–11)

## 2024-12-17 PROCEDURE — 88264 CHROMOSOME ANALYSIS 20-25: CPT | Performed by: INTERNAL MEDICINE

## 2024-12-17 PROCEDURE — 88237 TISSUE CULTURE BONE MARROW: CPT | Performed by: INTERNAL MEDICINE

## 2024-12-17 PROCEDURE — 88314 HISTOCHEMICAL STAINS ADD-ON: CPT | Performed by: INTERNAL MEDICINE

## 2024-12-17 PROCEDURE — 88333 PATH CONSLTJ SURG CYTO XM 1: CPT | Performed by: INTERNAL MEDICINE

## 2024-12-17 PROCEDURE — 88342 IMHCHEM/IMCYTCHM 1ST ANTB: CPT | Performed by: INTERNAL MEDICINE

## 2024-12-17 PROCEDURE — 88275 CYTOGENETICS 100-300: CPT | Performed by: INTERNAL MEDICINE

## 2024-12-17 PROCEDURE — 88365 INSITU HYBRIDIZATION (FISH): CPT | Performed by: INTERNAL MEDICINE

## 2024-12-17 PROCEDURE — 88364 INSITU HYBRIDIZATION (FISH): CPT | Performed by: INTERNAL MEDICINE

## 2024-12-17 PROCEDURE — 88291 CYTO/MOLECULAR REPORT: CPT | Performed by: INTERNAL MEDICINE

## 2024-12-17 PROCEDURE — 88185 FLOWCYTOMETRY/TC ADD-ON: CPT | Performed by: INTERNAL MEDICINE

## 2024-12-17 PROCEDURE — 36415 COLL VENOUS BLD VENIPUNCTURE: CPT

## 2024-12-17 PROCEDURE — 77012 CT SCAN FOR NEEDLE BIOPSY: CPT | Performed by: INTERNAL MEDICINE

## 2024-12-17 PROCEDURE — 88341 IMHCHEM/IMCYTCHM EA ADD ANTB: CPT | Performed by: INTERNAL MEDICINE

## 2024-12-17 PROCEDURE — 88305 TISSUE EXAM BY PATHOLOGIST: CPT | Performed by: INTERNAL MEDICINE

## 2024-12-17 PROCEDURE — 88271 CYTOGENETICS DNA PROBE: CPT | Performed by: INTERNAL MEDICINE

## 2024-12-17 PROCEDURE — 38222 DX BONE MARROW BX & ASPIR: CPT | Performed by: INTERNAL MEDICINE

## 2024-12-17 PROCEDURE — 85097 BONE MARROW INTERPRETATION: CPT | Performed by: INTERNAL MEDICINE

## 2024-12-17 PROCEDURE — 88184 FLOWCYTOMETRY/ TC 1 MARKER: CPT | Performed by: INTERNAL MEDICINE

## 2024-12-17 PROCEDURE — 88313 SPECIAL STAINS GROUP 2: CPT | Performed by: INTERNAL MEDICINE

## 2024-12-17 PROCEDURE — 85027 COMPLETE CBC AUTOMATED: CPT

## 2024-12-17 PROCEDURE — 85610 PROTHROMBIN TIME: CPT

## 2024-12-17 PROCEDURE — 99152 MOD SED SAME PHYS/QHP 5/>YRS: CPT | Performed by: INTERNAL MEDICINE

## 2024-12-17 RX ORDER — NALOXONE HYDROCHLORIDE 0.4 MG/ML
INJECTION, SOLUTION INTRAMUSCULAR; INTRAVENOUS; SUBCUTANEOUS
Status: DISCONTINUED
Start: 2024-12-17 | End: 2024-12-17 | Stop reason: WASHOUT

## 2024-12-17 RX ORDER — MIDAZOLAM HYDROCHLORIDE 1 MG/ML
1 INJECTION INTRAMUSCULAR; INTRAVENOUS EVERY 5 MIN PRN
Status: DISCONTINUED | OUTPATIENT
Start: 2024-12-17 | End: 2024-12-19

## 2024-12-17 RX ORDER — NALOXONE HYDROCHLORIDE 0.4 MG/ML
0.08 INJECTION, SOLUTION INTRAMUSCULAR; INTRAVENOUS; SUBCUTANEOUS AS NEEDED
Status: DISCONTINUED | OUTPATIENT
Start: 2024-12-17 | End: 2024-12-19

## 2024-12-17 RX ORDER — MIDAZOLAM HYDROCHLORIDE 1 MG/ML
INJECTION INTRAMUSCULAR; INTRAVENOUS
Status: COMPLETED
Start: 2024-12-17 | End: 2024-12-17

## 2024-12-17 RX ORDER — FLUMAZENIL 0.1 MG/ML
0.2 INJECTION INTRAVENOUS AS NEEDED
Status: DISCONTINUED | OUTPATIENT
Start: 2024-12-17 | End: 2024-12-19

## 2024-12-17 RX ORDER — SODIUM CHLORIDE 9 MG/ML
INJECTION, SOLUTION INTRAVENOUS CONTINUOUS
Status: DISCONTINUED | OUTPATIENT
Start: 2024-12-17 | End: 2024-12-19

## 2024-12-17 RX ORDER — FLUMAZENIL 0.1 MG/ML
INJECTION INTRAVENOUS
Status: DISCONTINUED
Start: 2024-12-17 | End: 2024-12-17 | Stop reason: WASHOUT

## 2024-12-17 RX ADMIN — SODIUM CHLORIDE: 9 INJECTION, SOLUTION INTRAVENOUS at 10:39:00

## 2024-12-17 RX ADMIN — MIDAZOLAM HYDROCHLORIDE 1 MG: 1 INJECTION INTRAMUSCULAR; INTRAVENOUS at 10:49:00

## 2024-12-17 NOTE — IMAGING NOTE
Patient arrived to radiology holding. Patient name,  and allergies reviewed. IV access obtained as per LDA documentation. NPO status verified. Pertinent labs and radiology imaging reviewed.    Informed consent for image guided bone marrow biopsy and aspiration obtained by Dr. Boogie [facility].     Biopsy site as per [facility] documentation. Patient denies pain.    Patient tolerated procedural sedation without any immediate complications noted.    Report given to VITALY Pineda. Patient transported to [facility] room 2244, accompanied by RN and KENISHA Le assistant.

## 2024-12-17 NOTE — DISCHARGE INSTRUCTIONS
Discharge/After Visit [facility] - Department of Radiology  Biopsy - Renal (Kidney), Liver, Lung, Bone, Retroperitoneal Location    Post Sedation  Follow these guidelines:  You should be watched overnight by a responsible adult. This person should make sure your condition remains stable.   Do not drink any alcohol for 24 hours after the procedure.  Don’t drive, operate dangerous machinery, make important business or personal decisions, or sign legal documents for 24 hours after the procedure.  Note: Your healthcare provider may tell you not to take any medicine by mouth for pain or sleep for a period of time. These medicines may react with the medicine you were given during your procedure. This could cause a much stronger response than usual.     Activity:  Take it easy for the rest of the day after your biopsy.   You may be sore at the site of the biopsy for the next 5 days.   Do not do any strenuous exercises or lift over five pounds for the next 24 hours.     Biopsy Site:  Keep a bandage on the biopsy site for 24 hours after the procedure.   You may shower after 24 hours, but no soaking in a bathtub for 48 hours.     Lung Biopsy: If chest pain or shortness of breath occurs, go to the nearest Emergency Room.   Liver Biopsy: If there is any increase in right-sided back, shoulder, or abdominal pain, go to the nearest Emergency Room. Call your doctor for unusual dizziness or weakness.   Renal Biopsy: Report any bloody urine, bleeding at the site, swelling, or pain to your ordering provider,      Diet: Drink plenty of fluids and resume your usual home diet. A slow return to normal foods minimizes nausea.    Pain Management:   You may use over-the-counter or prescribed pain relief medication if it is not contraindicated by another condition.     Medications:  You may resume your usual home medications. You may resume blood thinners 24 hours after the procedure if there is no bleeding from/hematoma at  the puncture site or bloody urine (Renal Biopsy only)     When to seek medical advice:  Call the provider that ordered the biopsy with any questions and to discuss test results. These may also be available in your [facility] My Chart. You may also contact the Radiology Nurse at [phone], M-F, 8-5 with any additional questions or concerns.  Dial [phone] and ask the  to page the on-call IR Radiologist if any of these occur:    A change in color or temperature of the area where the biopsy was performed.  You develop increasing pain or shortness of breath.  Unusual drowsiness, weakness, or dizziness.  Unusual vomiting.     IF YOU FEEL YOU ARE EXPERIENCING AN EMERGENCY,   CALL 911 OR GO TO THE NEAREST EMERGENCY ROOM      4.2.24 MO/  Radiology

## 2024-12-17 NOTE — PROCEDURES
Lt iliac bone.  1 and 10 ml aspirates and 11G On[employer] core.  Comp-none.  [employer] present.

## 2024-12-20 LAB
CD10 CELLS NFR SPEC: 1 %
CD10/CD19: 1 %
CD19 CELLS NFR SPEC: 7 %
CD19+ MM: 16 %
CD19+/CD200+: 3 %
CD19+/CD38+ MM: 16 %
CD2 CELLS NFR SPEC: 90 %
CD20 CELLS NFR SPEC: 6 %
CD200 CELLS: 20 %
CD3 CELLS NFR SPEC: 84 %
CD3+/TCRGD+: 2 %
CD3+CD4+ CELLS NFR SPEC: 69 %
CD3+CD4+ CELLS/CD3+CD8+ CLL SPEC: 4.9
CD3+CD8+ CELLS NFR SPEC: 14 %
CD3-/CD56+: 9 %
CD34 CELLS NFR SPEC: <1 %
CD38 CELLS NFR SPEC: 6 %
CD38+/CD19+: <1 %
CD45 CELLS NFR SPEC: 100 %
CD45-/CD38+ KAPPA: 18 %
CD45-/CD38+ LAMBDA: 76 %
CD5 CELLS NFR SPEC: 83 %
CD5/CD19 CELLS: 1 %
CD56 MM: 5 %
CD7 CELLS NFR SPEC: 90 %
CELL SURF KAPPA/LAMBDA RATIO: 2
CELL SURF LAMBDA LIGHT CHAIN: 2 %
CELL SURFACE KAPPA LIGHT CHAIN: 4 %
TCR G-D CELLS NFR SPEC: 2 %

## 2024-12-26 LAB
CELLS ANALYZED LEUK/LYM: 20
CELLS COUNTED LEUK/LYM: 20
CELLS KARYOTYPED LEUK/LYM: 2
GTG BAND LEUK/LYM: 400

## 2024-12-31 ENCOUNTER — LAB ENCOUNTER (OUTPATIENT)
Dept: LAB | Age: 54
End: 2024-12-31
Attending: INTERNAL MEDICINE
Payer: COMMERCIAL

## 2024-12-31 DIAGNOSIS — R77.8 ABNORMAL SPEP: ICD-10-CM

## 2024-12-31 DIAGNOSIS — E78.00 HIGH CHOLESTEROL: ICD-10-CM

## 2024-12-31 LAB
ALT SERPL-CCNC: 23 U/L
AST SERPL-CCNC: 20 U/L (ref ?–34)
B2 MICROGLOB SERPL-MCNC: 2.67 (ref 1–2.4)
BASOPHILS # BLD AUTO: 0.07 X10(3) UL (ref 0–0.2)
BASOPHILS NFR BLD AUTO: 0.9 %
CHOLEST SERPL-MCNC: 171 MG/DL (ref ?–200)
DEPRECATED HBV CORE AB SER IA-ACNC: 303 NG/ML
EOSINOPHIL # BLD AUTO: 0.12 X10(3) UL (ref 0–0.7)
EOSINOPHIL NFR BLD AUTO: 1.5 %
ERYTHROCYTE [DISTWIDTH] IN BLOOD BY AUTOMATED COUNT: 13 %
FASTING PATIENT LIPID ANSWER: YES
HCT VFR BLD AUTO: 34.1 %
HDLC SERPL-MCNC: 37 MG/DL (ref 40–59)
HGB BLD-MCNC: 11.6 G/DL
IGA SERPL-MCNC: 43.1 MG/DL (ref 40–350)
IGM SERPL-MCNC: 52.8 MG/DL (ref 50–300)
IMM GRANULOCYTES # BLD AUTO: 0.05 X10(3) UL (ref 0–1)
IMM GRANULOCYTES NFR BLD: 0.6 %
IMMUNOGLOBULIN PNL SER-MCNC: 2753 MG/DL (ref 650–1600)
IRON SATN MFR SERPL: 42 %
IRON SERPL-MCNC: 93 UG/DL
LDH SERPL L TO P-CCNC: 159 U/L
LDLC SERPL CALC-MCNC: 107 MG/DL (ref ?–100)
LYMPHOCYTES # BLD AUTO: 2.34 X10(3) UL (ref 1–4)
LYMPHOCYTES NFR BLD AUTO: 29.1 %
MCH RBC QN AUTO: 33.1 PG (ref 26–34)
MCHC RBC AUTO-ENTMCNC: 34 G/DL (ref 31–37)
MCV RBC AUTO: 97.4 FL
MONOCYTES # BLD AUTO: 0.49 X10(3) UL (ref 0.1–1)
MONOCYTES NFR BLD AUTO: 6.1 %
NEUTROPHILS # BLD AUTO: 4.98 X10 (3) UL (ref 1.5–7.7)
NEUTROPHILS # BLD AUTO: 4.98 X10(3) UL (ref 1.5–7.7)
NEUTROPHILS NFR BLD AUTO: 61.8 %
NONHDLC SERPL-MCNC: 134 MG/DL (ref ?–130)
PLATELET # BLD AUTO: 311 10(3)UL (ref 150–450)
RBC # BLD AUTO: 3.5 X10(6)UL
TOTAL IRON BINDING CAPACITY: 223 UG/DL (ref 250–425)
TRANSFERRIN SERPL-MCNC: 162 MG/DL (ref 250–380)
TRIGL SERPL-MCNC: 155 MG/DL (ref 30–149)
VLDLC SERPL CALC-MCNC: 26 MG/DL (ref 0–30)
WBC # BLD AUTO: 8.1 X10(3) UL (ref 4–11)

## 2024-12-31 PROCEDURE — 84450 TRANSFERASE (AST) (SGOT): CPT | Performed by: INTERNAL MEDICINE

## 2024-12-31 PROCEDURE — 80061 LIPID PANEL: CPT | Performed by: INTERNAL MEDICINE

## 2024-12-31 PROCEDURE — 84460 ALANINE AMINO (ALT) (SGPT): CPT | Performed by: INTERNAL MEDICINE

## 2025-01-02 ENCOUNTER — LAB ENCOUNTER (OUTPATIENT)
Dept: LAB | Age: 55
End: 2025-01-02
Attending: INTERNAL MEDICINE
Payer: COMMERCIAL

## 2025-01-02 DIAGNOSIS — R77.8 ABNORMAL SPEP: ICD-10-CM

## 2025-01-02 DIAGNOSIS — E78.00 HIGH CHOLESTEROL: ICD-10-CM

## 2025-01-02 LAB
M PROTEIN 24H UR ELPH-MRATE: 129.6 MG/24 HR (ref ?–149.1)
PROT UR-MCNC: 6.4 MG/DL (ref ?–14)
SPECIMEN VOL UR: 1600 ML

## 2025-01-02 PROCEDURE — 84156 ASSAY OF PROTEIN URINE: CPT

## 2025-01-02 PROCEDURE — 84166 PROTEIN E-PHORESIS/URINE/CSF: CPT

## 2025-01-02 PROCEDURE — 86335 IMMUNFIX E-PHORSIS/URINE/CSF: CPT

## 2025-01-06 ENCOUNTER — OFFICE VISIT (OUTPATIENT)
Age: 55
End: 2025-01-06
Attending: INTERNAL MEDICINE
Payer: COMMERCIAL

## 2025-01-06 VITALS
BODY MASS INDEX: 36.54 KG/M2 | WEIGHT: 214 LBS | DIASTOLIC BLOOD PRESSURE: 82 MMHG | SYSTOLIC BLOOD PRESSURE: 144 MMHG | HEART RATE: 88 BPM | RESPIRATION RATE: 16 BRPM | OXYGEN SATURATION: 97 % | HEIGHT: 64.02 IN | TEMPERATURE: 98 F

## 2025-01-06 DIAGNOSIS — D64.9 ANEMIA, UNSPECIFIED TYPE: ICD-10-CM

## 2025-01-06 DIAGNOSIS — D47.2 SMOLDERING MULTIPLE MYELOMA (SMM): Primary | ICD-10-CM

## 2025-01-06 NOTE — PROGRESS NOTES
Hematology/Oncology Initial Consultation Note    Patient Name: Lindsey Dean  Medical Record Number: [id number]    YOB: 1970   Date of Consultation: 1/6/2025   PCP: Willie Miguel DO   Other providers:      Reason for Consultation:  Lindsey Dean was seen today for the diagnosis of SMM    Oncologic History:  Adpated from Nov 2024    Iron deficiency     History of iron deficiency since 2014 (ferritin 3-33). She follows with GI for chronic GI issues. She has a history of a GI ulcer in the past. EGD/Colon from 2024-duodenitis, 3 cm hiatal hernia, multiple colon polyps with a 3 year recall. She is on a PPI. CTE from July 2024 negative aside from diverticulosis. She also had a normal VCE in 2024. She has been on a PPI for a year. No IV iron or blood transfusions in the past. She has been on oral for several years without improvement. Dad with kidney cancer. Grandmother with leukoenia, uterine and breast. Other grandmother with colon cancer.    Plasma cell disorder labs  Nov 2024  IgG Lambda M spike of 2.13, Lambda 14.1, Kappa 0.577, Ratio 24.5     24 hour urine - Monoclonal IgG Lambda detected.     Beta 2 Microglobulin 2.67    LDH is normal    Bone marrow biopsy   -Plasma cells comprise approximately 15% of all nucleated cells. Normal Karyotype. No abnormalities on the FISH panel.     PET CT :   1. No abnormal/pathologic radiotracer uptake.   2. Nonspecific hepatomegaly.    Hemoglobin close to normal at 11.6, creatinine and calcium normal.   ===================================================  History of Present Illness:      Presents to the clinic with her  to establish care.  Denies having any new complaints.  Denies any other complaints or concerns symptoms.  Denies abnormal cardiac conditions.  No easy bruising.  No leg swelling.  No active bleeding.      Past Medical History:  Past Medical History:    Abdominal pain    Anemia    Bloating    Blood disorder    anemia    Cancer (HCC)     basal cell    Diarrhea, unspecified    Worse in past 6 months    Diverticulosis of large intestine    Dizziness    Easy bruising    Fatigue    Frequent urination    Glaucoma    Not diagnosed yet waiting for 6 month follow up but  feels it will be    Headache disorder    Heartburn    On and off for 5 years    Heavy menses    High blood pressure    High cholesterol    Hx of motion sickness    IBS (irritable bowel syndrome)    Irregular bowel habits    Leaking of urine    Leg swelling    Menses painful    Rash    Stool incontinence    On and off for 10 years, worse in past 3 months    Weight gain       Past Surgical History:   Procedure Laterality Date          x2    Colonoscopy      Egd         Home Medications:   LOSARTAN 50 MG Oral Tab TAKE 1 TABLET(50 MG) BY MOUTH DAILY 90 tablet 1    Omeprazole 40 MG Oral Capsule Delayed Release Take 1 capsule (40 mg total) by mouth daily. 90 capsule 3    VITAMIN D OR Take by mouth.      Cyanocobalamin (VITAMIN B 12 OR) Take by mouth.       -------  Medications Ordered Prior to Encounter[1]    Allergies:   Allergies[2]    Psychosocial History:  Social History     Social History Narrative    Not on file     Social History     Socioeconomic History    Marital status:    Tobacco Use    Smoking status: Never    Smokeless tobacco: Never   Substance and Sexual Activity    Alcohol use: No    Drug use: Never   Other Topics Concern    Caffeine Concern Yes     Comment: soda    Exercise Yes     Comment: water excerises       Family Medical History:  Family History   Problem Relation Age of Onset    Other (kidney cancer) Father     Other (HTN) Father     Other (CABG) Father 53    Other (stent) Father     Heart Attack Father     Stroke Father     Colon Polyps Father     Asthma Mother     Cancer Maternal Grandmother         leukemia    Breast Cancer Maternal Grandmother 65    Ovarian Cancer Maternal Grandmother 60    Uterine Cancer Maternal Grandmother     Ovarian Cancer  Paternal Grandmother 75    Colon Cancer Paternal Grandmother     Breast Cancer Paternal Grandmother 60 - 69    Juvenile Rheumatoid Arthritis Sister        Review of Systems:  A 10-point ROS was done with pertinent positives and negative per the HPI    Vital Signs:  Height: 162.6 cm (5' 4.02\") (01/06 1333)  Weight: 97.1 kg (214 lb) (01/06 1333)  BSA (Calculated - sq m): 2.01 sq meters (01/06 1333)  Pulse: 88 (01/06 1333)  BP: 144/82 (01/06 1333)  Temp: 97.9 °F (36.6 °C) (01/06 1333)  Do Not Use - Resp Rate: --  SpO2: 97 % (01/06 1333)    Wt Readings from Last 6 Encounters:   01/06/25 97.1 kg (214 lb)   12/05/24 97.5 kg (215 lb)   12/16/24 97.1 kg (214 lb)   12/02/24 97.5 kg (215 lb)   11/15/24 97 kg (213 lb 12.8 oz)   11/05/24 97.1 kg (214 lb)       ECOG PS: 0    Physical Examination:  General: Patient is alert and oriented, not in acute distress  Psych: Mood and affect are appropriate  Eyes: EOMI, PERRL  ENT: Oropharynx is clear, no adenopathy  CV: Regular rate and rhythm, normal S1S2, no murmurs, no LE edema  Respiratory: Lungs clear to auscultation bilaterally  GI/Abd: Soft, non-tender with normoactive bowel sounds, no hepatosplenomegaly  Neurological: Grossly intact   Lymphatics: No palpable cervical, supraclavicular, axillary, or inguinal lymphadenopathy  Skin: no rashes or petechiae      Laboratory:  Lab Results   Component Value Date    WBC 8.1 12/31/2024    WBC 6.6 12/17/2024    WBC 6.2 11/05/2024    HGB 11.6 (L) 12/31/2024    HGB 12.4 12/17/2024    HGB 11.9 (L) 11/05/2024    HCT 34.1 (L) 12/31/2024    MCV 97.4 12/31/2024    MCH 33.1 12/31/2024    MCHC 34.0 12/31/2024    RDW 13.0 12/31/2024    .0 12/31/2024    .0 12/17/2024    .0 11/05/2024     Lab Results   Component Value Date    GLU 91 07/11/2024    BUN 10 07/11/2024    BUNCREA 11.9 07/11/2024    CREATSERUM 0.84 07/11/2024    CREATSERUM 0.98 06/30/2023    CREATSERUM 0.86 07/27/2022    ANIONGAP 3 07/11/2024    GFR 71 08/01/2017     GFRNAA 78 07/27/2022    GFRAA 90 07/27/2022    CA 9.5 07/11/2024    OSMOCALC 285 07/11/2024    ALKPHO 82 07/11/2024    AST 20 12/31/2024    ALT 23 12/31/2024    BILT 0.5 07/11/2024    TP 8.1 11/05/2024    ALB 4.11 11/05/2024    GLOBULIN 4.3 (H) 07/11/2024    AGRATIO 1.1 11/27/2013     07/11/2024    K 4.4 07/11/2024     07/11/2024    CO2 28.0 07/11/2024     Lab Results   Component Value Date    INR 1.01 12/17/2024       Imaging:    PET CT reviewed     Impression & Plan:     40-year-old female with 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  Less than plasma cell involvement in the bone marrow and FLC ratio being less than 20.  No signs of endorgan damage  Patient is a risk of progression to a full-blown multiple myeloma is close to 10% in the first 5 years with the highest risk in the first 1 to 2 years.  Will closely follow-up with the patient with monitoring of her monoclonal protein in the next 3 months.    The patient had serum monoclonal protein increases significantly  in the next 3 months we could consider treating with single agent Milagro or lenalidomide.    Anemia  -Likely secondary to chronic inflammation.  Iron levels have been appropriate.  Will follow-up with iron labs in the next 3 months.    Karuna Melgar MD  [facility]  [facility]ostegui [facility] [place]               [1]   Current Outpatient Medications on File Prior to Visit   Medication Sig Dispense Refill    LOSARTAN 50 MG Oral Tab TAKE 1 TABLET(50 MG) BY MOUTH DAILY 90 tablet 1    Omeprazole 40 MG Oral Capsule Delayed Release Take 1 capsule (40 mg total) by mouth daily. 90 capsule 3    VITAMIN D OR Take by mouth.      Cyanocobalamin (VITAMIN B 12 OR) Take by mouth.       No current facility-administered medications on file prior to visit.   [2]   Allergies  Allergen Reactions    Radiology Contrast Iodinated Dyes  HIVES    Aspirin HIVES    Iodine (Topical)     Lisinopril Coughing     ACE cough

## 2025-02-27 ENCOUNTER — HOSPITAL ENCOUNTER (OUTPATIENT)
Dept: MAMMOGRAPHY | Age: 55
Discharge: HOME | End: 2025-02-27
Attending: OBSTETRICS & GYNECOLOGY
Payer: COMMERCIAL

## 2025-02-27 DIAGNOSIS — Z12.31 ENCOUNTER FOR SCREENING MAMMOGRAM FOR BREAST CANCER: ICD-10-CM

## 2025-02-27 PROCEDURE — 77067 SCR MAMMO BI INCL CAD: CPT | Performed by: OBSTETRICS & GYNECOLOGY

## 2025-02-27 PROCEDURE — 77063 BREAST TOMOSYNTHESIS BI: CPT | Performed by: OBSTETRICS & GYNECOLOGY

## 2025-03-31 ENCOUNTER — LAB ENCOUNTER (OUTPATIENT)
Dept: LAB | Age: 55
End: 2025-03-31
Attending: STUDENT IN AN ORGANIZED HEALTH CARE EDUCATION/TRAINING PROGRAM
Payer: COMMERCIAL

## 2025-03-31 DIAGNOSIS — D64.9 ANEMIA, UNSPECIFIED TYPE: ICD-10-CM

## 2025-03-31 DIAGNOSIS — D47.2 SMOLDERING MULTIPLE MYELOMA (SMM): ICD-10-CM

## 2025-03-31 LAB
B2 MICROGLOB SERPL-MCNC: 2.85 (ref 1–2.4)
BASOPHILS # BLD AUTO: 0.05 X10(3) UL (ref 0–0.2)
BASOPHILS NFR BLD AUTO: 0.8 %
DEPRECATED HBV CORE AB SER IA-ACNC: 123 NG/ML
EOSINOPHIL # BLD AUTO: 0.11 X10(3) UL (ref 0–0.7)
EOSINOPHIL NFR BLD AUTO: 1.7 %
ERYTHROCYTE [DISTWIDTH] IN BLOOD BY AUTOMATED COUNT: 12.3 %
HCT VFR BLD AUTO: 38.3 %
HGB BLD-MCNC: 12.4 G/DL
IGA SERPL-MCNC: 33.4 MG/DL (ref 40–350)
IGM SERPL-MCNC: 50.7 MG/DL (ref 50–300)
IMM GRANULOCYTES # BLD AUTO: 0.01 X10(3) UL (ref 0–1)
IMM GRANULOCYTES NFR BLD: 0.2 %
IMMUNOGLOBULIN PNL SER-MCNC: 2918 MG/DL (ref 650–1600)
IRON SATN MFR SERPL: 22 %
IRON SERPL-MCNC: 53 UG/DL
LYMPHOCYTES # BLD AUTO: 2.07 X10(3) UL (ref 1–4)
LYMPHOCYTES NFR BLD AUTO: 31.9 %
MCH RBC QN AUTO: 32 PG (ref 26–34)
MCHC RBC AUTO-ENTMCNC: 32.4 G/DL (ref 31–37)
MCV RBC AUTO: 98.7 FL
MONOCYTES # BLD AUTO: 0.35 X10(3) UL (ref 0.1–1)
MONOCYTES NFR BLD AUTO: 5.4 %
NEUTROPHILS # BLD AUTO: 3.9 X10 (3) UL (ref 1.5–7.7)
NEUTROPHILS # BLD AUTO: 3.9 X10(3) UL (ref 1.5–7.7)
NEUTROPHILS NFR BLD AUTO: 60 %
PLATELET # BLD AUTO: 340 10(3)UL (ref 150–450)
RBC # BLD AUTO: 3.88 X10(6)UL
TOTAL IRON BINDING CAPACITY: 245 UG/DL (ref 250–425)
TRANSFERRIN SERPL-MCNC: 175 MG/DL (ref 250–380)
WBC # BLD AUTO: 6.5 X10(3) UL (ref 4–11)

## 2025-03-31 PROCEDURE — 82784 ASSAY IGA/IGD/IGG/IGM EACH: CPT

## 2025-03-31 PROCEDURE — 83540 ASSAY OF IRON: CPT

## 2025-03-31 PROCEDURE — 84165 PROTEIN E-PHORESIS SERUM: CPT

## 2025-03-31 PROCEDURE — 83550 IRON BINDING TEST: CPT

## 2025-03-31 PROCEDURE — 85025 COMPLETE CBC W/AUTO DIFF WBC: CPT

## 2025-03-31 PROCEDURE — 82232 ASSAY OF BETA-2 PROTEIN: CPT

## 2025-03-31 PROCEDURE — 83521 IG LIGHT CHAINS FREE EACH: CPT

## 2025-03-31 PROCEDURE — 82728 ASSAY OF FERRITIN: CPT

## 2025-03-31 PROCEDURE — 36415 COLL VENOUS BLD VENIPUNCTURE: CPT

## 2025-03-31 PROCEDURE — 86334 IMMUNOFIX E-PHORESIS SERUM: CPT

## 2025-04-01 LAB
KAPPA LC FREE SER-MCNC: 0.51 MG/DL (ref 0.33–1.94)
KAPPA LC FREE/LAMBDA FREE SER NEPH: 0.03 {RATIO} (ref 0.26–1.65)
LAMBDA LC FREE SERPL-MCNC: 17.42 MG/DL (ref 0.57–2.63)

## 2025-04-02 LAB
ALBUMIN SERPL ELPH-MCNC: 3.86 G/DL (ref 3.75–5.21)
ALBUMIN/GLOB SERPL: 0.96 {RATIO} (ref 1–2)
ALPHA1 GLOB SERPL ELPH-MCNC: 0.27 G/DL (ref 0.19–0.46)
ALPHA2 GLOB SERPL ELPH-MCNC: 0.72 G/DL (ref 0.48–1.05)
B-GLOBULIN SERPL ELPH-MCNC: 0.62 G/DL (ref 0.68–1.23)
GAMMA GLOB SERPL ELPH-MCNC: 2.43 G/DL (ref 0.62–1.7)
M PROTEIN 1 SERPL ELPH-MCNC: 2.15 G/DL (ref ?–0)
PROT SERPL-MCNC: 7.9 G/DL (ref 5.7–8.2)

## 2025-04-07 ENCOUNTER — OFFICE VISIT (OUTPATIENT)
Age: 55
End: 2025-04-07
Attending: INTERNAL MEDICINE
Payer: COMMERCIAL

## 2025-04-07 VITALS
TEMPERATURE: 98 F | BODY MASS INDEX: 36.05 KG/M2 | OXYGEN SATURATION: 98 % | DIASTOLIC BLOOD PRESSURE: 84 MMHG | HEART RATE: 88 BPM | HEIGHT: 64.02 IN | RESPIRATION RATE: 16 BRPM | WEIGHT: 211.19 LBS | SYSTOLIC BLOOD PRESSURE: 142 MMHG

## 2025-04-07 DIAGNOSIS — D47.2 SMOLDERING MULTIPLE MYELOMA (SMM): Primary | ICD-10-CM

## 2025-04-07 DIAGNOSIS — D64.9 ANEMIA, UNSPECIFIED TYPE: ICD-10-CM

## 2025-04-07 NOTE — PROGRESS NOTES
Hematology/Oncology Initial Consultation Note    Patient Name: Lindsey Dean  Medical Record Number: [id number]    YOB: 1970   Date of Consultation: 1/6/2025   PCP: Willie Miguel DO   Other providers:      Reason for Consultation:  Lindsey Dean was seen today for the diagnosis of SMM    Oncologic History:  Adpated from Nov 2024    Iron deficiency     History of iron deficiency since 2014 (ferritin 3-33). She follows with GI for chronic GI issues. She has a history of a GI ulcer in the past. EGD/Colon from 2024-duodenitis, 3 cm hiatal hernia, multiple colon polyps with a 3 year recall. She is on a PPI. CTE from July 2024 negative aside from diverticulosis. She also had a normal VCE in 2024. She has been on a PPI for a year. No IV iron or blood transfusions in the past. She has been on oral for several years without improvement. Dad with kidney cancer. Grandmother with leukoenia, uterine and breast. Other grandmother with colon cancer.    Plasma cell disorder labs  Nov 2024  IgG Lambda M spike of 2.13, Lambda 14.1, Kappa 0.577, Ratio 24.5     24 hour urine - Monoclonal IgG Lambda detected.     Beta 2 Microglobulin 2.67    LDH is normal    Bone marrow biopsy   -Plasma cells comprise approximately 15% of all nucleated cells. Normal Karyotype. No abnormalities on the FISH panel.     PET CT :   1. No abnormal/pathologic radiotracer uptake.   2. Nonspecific hepatomegaly.    Hemoglobin close to normal at 11.6, creatinine and calcium normal.     March 2025:   Kappa lambda FLC ratio of 0.03, M spike of 2.15 g/dl. Immunoparesis+.Beta 2 microglobulin 2.85       ===================================================  History of Present Illness:    Presents for follow-up.  Denies having any infections, bone pain.  No constitutional symptoms.  Going through perimenopausal symptoms including hot flashes, mood changes and diffuse itching.    Feeling tired.     Past Medical History:  Past Medical History:     Abdominal pain    Anemia    Bloating    Blood disorder    anemia    Cancer (HCC)    basal cell    Diarrhea, unspecified    Worse in past 6 months    Diverticulosis of large intestine    Dizziness    Easy bruising    Fatigue    Frequent urination    Glaucoma    Not diagnosed yet waiting for 6 month follow up but  feels it will be    Headache disorder    Heartburn    On and off for 5 years    Heavy menses    High blood pressure    High cholesterol    Hx of motion sickness    IBS (irritable bowel syndrome)    Irregular bowel habits    Leaking of urine    Leg swelling    Menses painful    Rash    Stool incontinence    On and off for 10 years, worse in past 3 months    Weight gain       Past Surgical History:   Procedure Laterality Date          x2    Colonoscopy      Egd         Home Medications:   LOSARTAN 50 MG Oral Tab TAKE 1 TABLET(50 MG) BY MOUTH DAILY 90 tablet 1    Omeprazole 40 MG Oral Capsule Delayed Release Take 1 capsule (40 mg total) by mouth daily. 90 capsule 3    VITAMIN D OR Take by mouth.      Cyanocobalamin (VITAMIN B 12 OR) Take by mouth.       -------  Medications Ordered Prior to Encounter[1]    Allergies:   Allergies[2]    Psychosocial History:  Social History     Social History Narrative    Not on file     Social History     Socioeconomic History    Marital status:    Tobacco Use    Smoking status: Never    Smokeless tobacco: Never   Substance and Sexual Activity    Alcohol use: No    Drug use: Never   Other Topics Concern    Caffeine Concern Yes     Comment: soda    Exercise Yes     Comment: water excerises       Family Medical History:  Family History   Problem Relation Age of Onset    Other (kidney cancer) Father     Other (HTN) Father     Other (CABG) Father 53    Other (stent) Father     Heart Attack Father     Stroke Father     Colon Polyps Father     Asthma Mother     Cancer Maternal Grandmother         leukemia    Breast Cancer Maternal Grandmother 65    Ovarian Cancer  Maternal Grandmother 60    Uterine Cancer Maternal Grandmother     Ovarian Cancer Paternal Grandmother 75    Colon Cancer Paternal Grandmother     Breast Cancer Paternal Grandmother 60 - 69    Juvenile Rheumatoid Arthritis Sister        Review of Systems:  A 10-point ROS was done with pertinent positives and negative per the HPI    Vital Signs:  Height: 162.6 cm (5' 4.02\") (04/07 0900)  Weight: 95.8 kg (211 lb 3.2 oz) (04/07 0900)  BSA (Calculated - sq m): 2 sq meters (04/07 0900)  Pulse: 88 (04/07 0900)  BP: 142/84 (04/07 0900)  Temp: 97.5 °F (36.4 °C) (04/07 0900)  Do Not Use - Resp Rate: --  SpO2: 98 % (04/07 0900)    Wt Readings from Last 6 Encounters:   04/07/25 95.8 kg (211 lb 3.2 oz)   01/06/25 97.1 kg (214 lb)   12/05/24 97.5 kg (215 lb)   12/16/24 97.1 kg (214 lb)   12/02/24 97.5 kg (215 lb)   11/15/24 97 kg (213 lb 12.8 oz)       ECOG PS: 0    Physical Examination:  General: Patient is alert and oriented, not in acute distress  Psych: Mood and affect are appropriate  Eyes: EOMI, PERRL  ENT: Oropharynx is clear, no adenopathy  CV: Regular rate and rhythm, normal S1S2, no murmurs, no LE edema  Respiratory: Lungs clear to auscultation bilaterally  GI/Abd: Soft, non-tender with normoactive bowel sounds, no hepatosplenomegaly  Neurological: Grossly intact   Lymphatics: No palpable cervical, supraclavicular, axillary, or inguinal lymphadenopathy  Skin: no rashes or petechiae      Laboratory:  Lab Results   Component Value Date    WBC 6.5 03/31/2025    WBC 8.1 12/31/2024    WBC 6.6 12/17/2024    HGB 12.4 03/31/2025    HGB 11.6 (L) 12/31/2024    HGB 12.4 12/17/2024    HCT 38.3 03/31/2025    MCV 98.7 03/31/2025    MCH 32.0 03/31/2025    MCHC 32.4 03/31/2025    RDW 12.3 03/31/2025    .0 03/31/2025    .0 12/31/2024    .0 12/17/2024     Lab Results   Component Value Date    GLU 91 07/11/2024    BUN 10 07/11/2024    BUNCREA 11.9 07/11/2024    CREATSERUM 0.84 07/11/2024    CREATSERUM 0.98 06/30/2023     CREATSERUM 0.86 07/27/2022    ANIONGAP 3 07/11/2024    GFR 71 08/01/2017    GFRNAA 78 07/27/2022    GFRAA 90 07/27/2022    CA 9.5 07/11/2024    OSMOCALC 285 07/11/2024    ALKPHO 82 07/11/2024    AST 20 12/31/2024    ALT 23 12/31/2024    BILT 0.5 07/11/2024    TP 7.9 03/31/2025    ALB 3.86 03/31/2025    GLOBULIN 4.3 (H) 07/11/2024    AGRATIO 1.1 11/27/2013     07/11/2024    K 4.4 07/11/2024     07/11/2024    CO2 28.0 07/11/2024     Lab Results   Component Value Date    INR 1.01 12/17/2024       Imaging:    PET CT reviewed     Impression & Plan:     54 -year-old female with 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  Less than 15% plasma cell involvement in the bone marrow and FLC ratio of 0.03    No signs of endorgan damage  Patient is a risk of progression to a full-blown multiple myeloma is close to 10% in the first 5 years with the highest risk in the first 1 to 2 years.  Will closely follow-up with the patient with monitoring of her monoclonal protein in the next 3 months.    Role of single agent daratumumab, single agent lenalidomide with dexamethasone and high risk smoldering multiple myeloma.  Discussed the recent study which showed increase in overall survival with Daratumumab in high risk -smoldering multiple myeloma.  However given that her monoclonal protein remains stable, and slow progression we will proceed with active surveillance.     Follow up in 3 months with repeat labs including CBC, Monoclonal protein evaluation.     Anemia  -Likely secondary to iron deficiency. We will repeat iron labs in 3 months.       Karuna Melgar MD  [facility]               [1]   Current Outpatient Medications on File Prior to Visit   Medication Sig Dispense Refill    LOSARTAN 50 MG Oral Tab TAKE 1 TABLET(50 MG) BY MOUTH DAILY 90 tablet 1    Omeprazole 40 MG Oral Capsule Delayed Release Take 1 capsule (40 mg total) by mouth daily. 90  capsule 3    VITAMIN D OR Take by mouth.      Cyanocobalamin (VITAMIN B 12 OR) Take by mouth.       No current facility-administered medications on file prior to visit.   [2]   Allergies  Allergen Reactions    Radiology Contrast Iodinated Dyes HIVES    Aspirin HIVES    Iodine (Topical)     Lisinopril Coughing     ACE cough

## 2025-04-21 ENCOUNTER — LAB ENCOUNTER (OUTPATIENT)
Dept: LAB | Age: 55
End: 2025-04-21
Attending: NURSE PRACTITIONER
Payer: COMMERCIAL

## 2025-04-21 ENCOUNTER — OFFICE VISIT (OUTPATIENT)
Dept: OBGYN CLINIC | Facility: CLINIC | Age: 55
End: 2025-04-21
Payer: COMMERCIAL

## 2025-04-21 VITALS
BODY MASS INDEX: 36 KG/M2 | WEIGHT: 210 LBS | DIASTOLIC BLOOD PRESSURE: 78 MMHG | SYSTOLIC BLOOD PRESSURE: 124 MMHG | HEART RATE: 90 BPM

## 2025-04-21 DIAGNOSIS — N95.1 MENOPAUSAL SYMPTOMS: ICD-10-CM

## 2025-04-21 DIAGNOSIS — N95.1 MENOPAUSAL SYMPTOMS: Primary | ICD-10-CM

## 2025-04-21 LAB
ESTRADIOL SERPL-MCNC: 19.3 PG/ML
FSH SERPL-ACNC: 94.5 MIU/ML

## 2025-04-21 PROCEDURE — 83001 ASSAY OF GONADOTROPIN (FSH): CPT | Performed by: NURSE PRACTITIONER

## 2025-04-21 PROCEDURE — 82670 ASSAY OF TOTAL ESTRADIOL: CPT | Performed by: NURSE PRACTITIONER

## 2025-04-21 RX ORDER — PSEUDOEPHEDRINE HCL 30 MG
TABLET ORAL
COMMUNITY
Start: 2025-04-07

## 2025-04-21 NOTE — PROGRESS NOTES
Here for new gynecology visit.  54 year old G 4 P 4.  Patient's last menstrual period was 2025 (exact date)..     Here to discuss HRT. She is still menstruating. She has regular cycles at times, or she has gone sine January since her last.    She has brain fog, dry skin with itching all over, night sweats, and hot flashes.    She has Smoldering Multiple Myeloma and wants to work on protecting her bone health as well.    Last pap smear was in  and it was normal.  No hx of abnormal pap smears.     OB Hx:  2 , 2 C/S.    Family gyn hx:  both grandmothers- ovarian.   Family breast hx:  both grandmothers.  Last mammogram in 2025.  Screening labs with PCP.  Colonoscopy up to date.    Past Medical History[1]    Past Surgical History[2]    Medications Ordered Prior to Encounter[3]    OB History    Para Term  AB Living   4 4    4   SAB IAB Ectopic Multiple Live Births       4      # Outcome Date GA Lbr Dayday/2nd Weight Sex Type Anes PTL Lv   4 Para      Caesarean   WALLACE   3 Para      NORMAL SPONT   WALLACE   2 Para      NORMAL SPONT   WALLACE   1 Para      Caesarean   WALLACE       ROS:    General:  No wt loss, wt gain, appetite changes.           /78   Pulse 90   Wt 210 lb (95.3 kg)   LMP 2025 (Exact Date)   BMI 36.03 kg/m²     Exam deferred    IMP/PLAN:    1. Menopausal symptoms  - FSH; Future  - Estradiol; Future  Discussed HRT including risk for VTE, MI, stroke, breast and uterine cancers  Advised on bleeding profile and the need for pelvic ultrasound and endometrial biopsy with any abnormal bleeding    See in 6-8 weeks for annual and follow up with any initiation of medication.         [1]   Past Medical History:   Abdominal pain    Anemia    Bloating    Blood disorder    anemia    Cancer (HCC)    basal cell    Diarrhea, unspecified    Worse in past 6 months    Diverticulosis of large intestine    Dizziness    Easy bruising    Fatigue    Frequent urination    Glaucoma    Not diagnosed yet  waiting for 6 month follow up but  feels it will be    Headache disorder    Heartburn    On and off for 5 years    Heavy menses    High blood pressure    High cholesterol    Hx of motion sickness    IBS (irritable bowel syndrome)    Irregular bowel habits    Leaking of urine    Leg swelling    Menses painful    Rash    Stool incontinence    On and off for 10 years, worse in past 3 months    Weight gain   [2]   Past Surgical History:  Procedure Laterality Date          x2    Colonoscopy      Egd     [3]   Current Outpatient Medications on File Prior to Visit   Medication Sig Dispense Refill    Calcium Citrate 250 MG Oral Tab       LOSARTAN 50 MG Oral Tab TAKE 1 TABLET(50 MG) BY MOUTH DAILY 90 tablet 1    Omeprazole 40 MG Oral Capsule Delayed Release Take 1 capsule (40 mg total) by mouth daily. 90 capsule 3    VITAMIN D OR Take by mouth.      Cyanocobalamin (VITAMIN B 12 OR) Take by mouth.       No current facility-administered medications on file prior to visit.

## 2025-04-22 ENCOUNTER — PATIENT MESSAGE (OUTPATIENT)
Dept: OBGYN CLINIC | Facility: CLINIC | Age: 55
End: 2025-04-22

## 2025-04-22 RX ORDER — PROGESTERONE 200 MG/1
200 CAPSULE ORAL NIGHTLY
Qty: 90 CAPSULE | Refills: 0 | Status: SHIPPED | OUTPATIENT
Start: 2025-04-22

## 2025-04-22 RX ORDER — ESTRADIOL 0.03 MG/D
1 PATCH TRANSDERMAL WEEKLY
Qty: 12 PATCH | Refills: 0 | Status: SHIPPED | OUTPATIENT
Start: 2025-04-22

## 2025-04-22 NOTE — TELEPHONE ENCOUNTER
Rx sent, she is to rotate site of placement of her patch each week. She can place it on her lower abdomen or upper buttock. She needs to discontinue it and call with any abnormal vaginal bleeding. She will take the oral prometrium nightly.

## 2025-06-23 ENCOUNTER — OFFICE VISIT (OUTPATIENT)
Dept: OBGYN CLINIC | Facility: CLINIC | Age: 55
End: 2025-06-23
Payer: COMMERCIAL

## 2025-06-23 VITALS
DIASTOLIC BLOOD PRESSURE: 86 MMHG | HEART RATE: 82 BPM | WEIGHT: 212 LBS | HEIGHT: 64 IN | SYSTOLIC BLOOD PRESSURE: 124 MMHG | BODY MASS INDEX: 36.19 KG/M2

## 2025-06-23 DIAGNOSIS — I10 PRIMARY HYPERTENSION: ICD-10-CM

## 2025-06-23 DIAGNOSIS — Z12.4 CERVICAL CANCER SCREENING: ICD-10-CM

## 2025-06-23 DIAGNOSIS — N95.1 MENOPAUSAL SYMPTOMS: Primary | ICD-10-CM

## 2025-06-23 DIAGNOSIS — Z01.419 WELL WOMAN EXAM WITH ROUTINE GYNECOLOGICAL EXAM: ICD-10-CM

## 2025-06-23 PROCEDURE — 87624 HPV HI-RISK TYP POOLED RSLT: CPT | Performed by: NURSE PRACTITIONER

## 2025-06-23 RX ORDER — LOSARTAN POTASSIUM 50 MG/1
50 TABLET ORAL DAILY
Qty: 90 TABLET | Refills: 1 | Status: SHIPPED | OUTPATIENT
Start: 2025-06-23

## 2025-06-23 RX ORDER — ESTRADIOL 0.03 MG/D
1 FILM, EXTENDED RELEASE TRANSDERMAL
Qty: 24 PATCH | Refills: 3 | Status: SHIPPED | OUTPATIENT
Start: 2025-06-23

## 2025-06-23 RX ORDER — PROGESTERONE 200 MG/1
200 CAPSULE ORAL NIGHTLY
Qty: 90 CAPSULE | Refills: 3 | Status: SHIPPED | OUTPATIENT
Start: 2025-06-23

## 2025-06-23 NOTE — PROGRESS NOTES
Here for Routine Annual Exam    No concerns or questions.  Menses are irregular.  Since starting HRT she has been going well for the most part.  She has had cold symptoms and a sore throat when she would put her patch on. It would dissipate within a few days. Each week it would be less intense.     Her sleep is much improved. The night sweats and brain fog are improved as well. The hot flashes are gone, no itching.    She had 1 day of spotting the beginning of May. Her next bleeding was June 6-/21 which was light and prolonged with wiping.    ROS: No Cardiac, Respiratory, GI,  or Neurological symptoms.    PE:  GENERAL: well developed, well nourished, in no apparent distress  SKIN: no rashes, no suspicious lesions  HEENT: normal  NECK: supple; no thyroidmegaly, no adenopathy  LUNGS: clear to auscultation  CARDIOVASCULAR: normal S1, S2, RRR  BREASTS: firm, nontendder, no palpable masses or nodes, no nipple discharge, no skin changes, no axillary adenopathy,    ABDOMEN: Soft, non distended; non tender, no masses  GYNE/: External Genitalia: Normal                       Vagina: normal                       Uterus: mid, mobile, non tender, normal size                     Cervix: no lesions                     Adnexa: non tender, no masses, normal size  EXTREMITIES:  non tender without edema    A/P:   1. Well woman exam with routine gynecological exam  Regular self breast exams recommended    2. Menopausal symptoms  Call with any abnormal bleeding moving forward  - estradiol (VIVELLE-DOT) 0.025 MG/24HR Transdermal Patch Biweekly; Place 1 patch onto the skin twice a week.  Dispense: 24 patch; Refill: 3    3. Cervical cancer screening  - ThinPrep PAP with HPV Reflex Request; Future     Return to clinic 1 year for routine exam, or as needed with any concerns or question

## 2025-06-27 LAB — HPV E6+E7 MRNA CVX QL NAA+PROBE: NEGATIVE

## 2025-06-28 ENCOUNTER — LAB ENCOUNTER (OUTPATIENT)
Dept: LAB | Age: 55
End: 2025-06-28
Attending: STUDENT IN AN ORGANIZED HEALTH CARE EDUCATION/TRAINING PROGRAM
Payer: COMMERCIAL

## 2025-06-28 DIAGNOSIS — D47.2 SMOLDERING MULTIPLE MYELOMA (SMM): ICD-10-CM

## 2025-06-28 LAB
ALBUMIN SERPL-MCNC: 4.1 G/DL (ref 3.2–4.8)
ALBUMIN/GLOB SERPL: 1 {RATIO} (ref 1–2)
ALP LIVER SERPL-CCNC: 76 U/L (ref 41–108)
ALT SERPL-CCNC: 14 U/L (ref 10–49)
ANION GAP SERPL CALC-SCNC: 6 MMOL/L (ref 0–18)
AST SERPL-CCNC: 14 U/L (ref ?–34)
BASOPHILS # BLD AUTO: 0.04 X10(3) UL (ref 0–0.2)
BASOPHILS NFR BLD AUTO: 0.6 %
BILIRUB SERPL-MCNC: 0.6 MG/DL (ref 0.3–1.2)
BUN BLD-MCNC: 8 MG/DL (ref 9–23)
CALCIUM BLD-MCNC: 9.2 MG/DL (ref 8.7–10.6)
CHLORIDE SERPL-SCNC: 104 MMOL/L (ref 98–112)
CO2 SERPL-SCNC: 28 MMOL/L (ref 21–32)
CREAT BLD-MCNC: 0.83 MG/DL (ref 0.55–1.02)
DEPRECATED HBV CORE AB SER IA-ACNC: 134 NG/ML (ref 50–306)
EGFRCR SERPLBLD CKD-EPI 2021: 84 ML/MIN/1.73M2 (ref 60–?)
EOSINOPHIL # BLD AUTO: 0.14 X10(3) UL (ref 0–0.7)
EOSINOPHIL NFR BLD AUTO: 2.2 %
ERYTHROCYTE [DISTWIDTH] IN BLOOD BY AUTOMATED COUNT: 13.6 %
FASTING STATUS PATIENT QL REPORTED: YES
GLOBULIN PLAS-MCNC: 4 G/DL (ref 2–3.5)
GLUCOSE BLD-MCNC: 92 MG/DL (ref 70–99)
HCT VFR BLD AUTO: 36.9 % (ref 35–48)
HGB BLD-MCNC: 12 G/DL (ref 12–16)
IGA SERPL-MCNC: 37.6 MG/DL (ref 40–350)
IGM SERPL-MCNC: 50.9 MG/DL (ref 50–300)
IMM GRANULOCYTES # BLD AUTO: 0.02 X10(3) UL (ref 0–1)
IMM GRANULOCYTES NFR BLD: 0.3 %
IMMUNOGLOBULIN PNL SER-MCNC: 2802 MG/DL (ref 650–1600)
IRON SATN MFR SERPL: 29 % (ref 15–50)
IRON SERPL-MCNC: 81 UG/DL (ref 50–170)
LYMPHOCYTES # BLD AUTO: 1.87 X10(3) UL (ref 1–4)
LYMPHOCYTES NFR BLD AUTO: 29.7 %
MCH RBC QN AUTO: 32.3 PG (ref 26–34)
MCHC RBC AUTO-ENTMCNC: 32.5 G/DL (ref 31–37)
MCV RBC AUTO: 99.2 FL (ref 80–100)
MONOCYTES # BLD AUTO: 0.39 X10(3) UL (ref 0.1–1)
MONOCYTES NFR BLD AUTO: 6.2 %
NEUTROPHILS # BLD AUTO: 3.84 X10 (3) UL (ref 1.5–7.7)
NEUTROPHILS # BLD AUTO: 3.84 X10(3) UL (ref 1.5–7.7)
NEUTROPHILS NFR BLD AUTO: 61 %
OSMOLALITY SERPL CALC.SUM OF ELEC: 284 MOSM/KG (ref 275–295)
PLATELET # BLD AUTO: 307 10(3)UL (ref 150–450)
POTASSIUM SERPL-SCNC: 4.1 MMOL/L (ref 3.5–5.1)
PROT SERPL-MCNC: 8.1 G/DL (ref 5.7–8.2)
RBC # BLD AUTO: 3.72 X10(6)UL (ref 3.8–5.3)
SODIUM SERPL-SCNC: 138 MMOL/L (ref 136–145)
TOTAL IRON BINDING CAPACITY: 279 UG/DL (ref 250–425)
TRANSFERRIN SERPL-MCNC: 206 MG/DL (ref 250–380)
WBC # BLD AUTO: 6.3 X10(3) UL (ref 4–11)

## 2025-06-28 PROCEDURE — 83540 ASSAY OF IRON: CPT

## 2025-06-28 PROCEDURE — 83550 IRON BINDING TEST: CPT

## 2025-06-28 PROCEDURE — 84165 PROTEIN E-PHORESIS SERUM: CPT

## 2025-06-28 PROCEDURE — 80053 COMPREHEN METABOLIC PANEL: CPT

## 2025-06-28 PROCEDURE — 83521 IG LIGHT CHAINS FREE EACH: CPT

## 2025-06-28 PROCEDURE — 36415 COLL VENOUS BLD VENIPUNCTURE: CPT

## 2025-06-28 PROCEDURE — 82728 ASSAY OF FERRITIN: CPT

## 2025-06-28 PROCEDURE — 86334 IMMUNOFIX E-PHORESIS SERUM: CPT

## 2025-06-28 PROCEDURE — 85025 COMPLETE CBC W/AUTO DIFF WBC: CPT

## 2025-06-28 PROCEDURE — 82784 ASSAY IGA/IGD/IGG/IGM EACH: CPT

## 2025-06-30 LAB
KAPPA LC FREE SER-MCNC: 0.71 MG/DL (ref 0.33–1.94)
KAPPA LC FREE/LAMBDA FREE SER NEPH: 0.04 {RATIO} (ref 0.26–1.65)
LAMBDA LC FREE SERPL-MCNC: 17.05 MG/DL (ref 0.57–2.63)

## 2025-07-01 LAB
ALBUMIN SERPL ELPH-MCNC: 3.79 G/DL (ref 3.75–5.21)
ALBUMIN/GLOB SERPL: 1 {RATIO} (ref 1–2)
ALPHA1 GLOB SERPL ELPH-MCNC: 0.3 G/DL (ref 0.19–0.46)
ALPHA2 GLOB SERPL ELPH-MCNC: 0.71 G/DL (ref 0.48–1.05)
B-GLOBULIN SERPL ELPH-MCNC: 0.64 G/DL (ref 0.68–1.23)
GAMMA GLOB SERPL ELPH-MCNC: 2.16 G/DL (ref 0.62–1.7)
M PROTEIN 1 SERPL ELPH-MCNC: 1.8 G/DL (ref ?–0)
PROT SERPL-MCNC: 7.6 G/DL (ref 5.7–8.2)

## 2025-07-07 ENCOUNTER — OFFICE VISIT (OUTPATIENT)
Age: 55
End: 2025-07-07
Attending: INTERNAL MEDICINE
Payer: COMMERCIAL

## 2025-07-07 VITALS
HEART RATE: 74 BPM | BODY MASS INDEX: 36.16 KG/M2 | RESPIRATION RATE: 16 BRPM | WEIGHT: 211.81 LBS | HEIGHT: 64.02 IN | DIASTOLIC BLOOD PRESSURE: 82 MMHG | OXYGEN SATURATION: 100 % | SYSTOLIC BLOOD PRESSURE: 127 MMHG | TEMPERATURE: 98 F

## 2025-07-07 DIAGNOSIS — R53.83 OTHER FATIGUE: ICD-10-CM

## 2025-07-07 DIAGNOSIS — D47.2 SMOLDERING MULTIPLE MYELOMA (SMM): Primary | ICD-10-CM

## 2025-07-07 NOTE — PROGRESS NOTES
Hematology/Oncology Initial Consultation Note    Patient Name: Lindsey Dean  Medical Record Number: [id number]    YOB: 1970   Date of Consultation: 1/6/2025   PCP: Willie Miguel DO   Other providers:      Reason for Consultation:  Lindsey Dean was seen today for the diagnosis of SMM    Oncologic History:  Adpated from Nov 2024    Iron deficiency     History of iron deficiency since 2014 (ferritin 3-33). She follows with GI for chronic GI issues. She has a history of a GI ulcer in the past. EGD/Colon from 2024-duodenitis, 3 cm hiatal hernia, multiple colon polyps with a 3 year recall. She is on a PPI. CTE from July 2024 negative aside from diverticulosis. She also had a normal VCE in 2024. She has been on a PPI for a year. No IV iron or blood transfusions in the past. She has been on oral for several years without improvement. Dad with kidney cancer. Grandmother with leukoenia, uterine and breast. Other grandmother with colon cancer.    Plasma cell disorder labs  Nov 2024  IgG Lambda M spike of 2.13, Lambda 14.1, Kappa 0.577, Ratio 24.5     24 hour urine - Monoclonal IgG Lambda detected.     Beta 2 Microglobulin 2.67    LDH is normal    Bone marrow biopsy   -Plasma cells comprise approximately 15% of all nucleated cells. Normal Karyotype. No abnormalities on the FISH panel.     PET CT :   1. No abnormal/pathologic radiotracer uptake.   2. Nonspecific hepatomegaly.    Hemoglobin close to normal at 11.6, creatinine and calcium normal.     March 2025:   Kappa lambda FLC ratio of 0.03, M spike of 2.15 g/dl. Immunoparesis+.Beta 2 microglobulin 2.85       ===================================================  History of Present Illness:        Presents for follow-up. Patient mentions that she had a trip to [place] and was struggling with altitude. Continue to have low energy and fatigue which occasionally limits her functioning,         Denies having any infections, bone pain.  No constitutional  symptoms.  Started HRT for perimenopausal symptoms which are helping although she developed some rashes.     Past Medical History:  Past Medical History:    Abdominal pain    Anemia    Bloating    Blood disorder    anemia    Cancer (HCC)    basal cell    Diarrhea, unspecified    Worse in past 6 months    Diverticulosis of large intestine    Dizziness    Easy bruising    Fatigue    Frequent urination    Glaucoma    Not diagnosed yet waiting for 6 month follow up but  feels it will be    Headache disorder    Heartburn    On and off for 5 years    Heavy menses    High blood pressure    High cholesterol    Hx of motion sickness    IBS (irritable bowel syndrome)    Irregular bowel habits    Leaking of urine    Leg swelling    Menses painful    Rash    Stool incontinence    On and off for 10 years, worse in past 3 months    Weight gain       Past Surgical History:   Procedure Laterality Date          x2    Colonoscopy      Egd         Home Medications:  No outpatient medications have been marked as taking for the 25 encounter (Office Visit) with Karuna Melgar MD.     -------  Medications Ordered Prior to Encounter[1]    Allergies:   Allergies[2]    Psychosocial History:  Social History     Social History Narrative    Not on file     Social History     Socioeconomic History    Marital status:    Tobacco Use    Smoking status: Never    Smokeless tobacco: Never   Substance and Sexual Activity    Alcohol use: No    Drug use: Never    Sexual activity: Yes     Birth control/protection: Tubal Ligation   Other Topics Concern    Caffeine Concern Yes     Comment: soda    Exercise Yes     Comment: water excerises     Social Drivers of Health     Food Insecurity: No Food Insecurity (2025)    NCSS - Food Insecurity     Worried About Running Out of Food in the Last Year: No     Ran Out of Food in the Last Year: No   Transportation Needs: No Transportation Needs (2025)    NCSS - Transportation      Lack of Transportation: No   Housing Stability: Not At Risk (6/23/2025)    NCSS - Housing/Utilities     Has Housing: Yes     Worried About Losing Housing: No     Unable to Get Utilities: No       Family Medical History:  Family History   Problem Relation Age of Onset    Other (kidney cancer) Father     Other (HTN) Father     Other (CABG) Father 53    Other (stent) Father     Heart Attack Father     Stroke Father     Colon Polyps Father     Asthma Mother     Cancer Maternal Grandmother         leukemia    Breast Cancer Maternal Grandmother 65    Ovarian Cancer Maternal Grandmother 60    Uterine Cancer Maternal Grandmother     Ovarian Cancer Paternal Grandmother 75    Colon Cancer Paternal Grandmother     Breast Cancer Paternal Grandmother 60 - 69    Juvenile Rheumatoid Arthritis Sister        Review of Systems:  A 10-point ROS was done with pertinent positives and negative per the HPI    Vital Signs:  Height: 162.6 cm (5' 4.02\") (07/07 0923)  Weight: 96.1 kg (211 lb 12.8 oz) (07/07 0923)  BSA (Calculated - sq m): 2 sq meters (07/07 0923)  Pulse: 74 (07/07 0923)  BP: 127/82 (07/07 0923)  Temp: 98 °F (36.7 °C) (07/07 0923)  Do Not Use - Resp Rate: --  SpO2: 100 % (07/07 0923)    Wt Readings from Last 6 Encounters:   07/07/25 96.1 kg (211 lb 12.8 oz)   06/23/25 96.2 kg (212 lb)   04/21/25 95.3 kg (210 lb)   04/07/25 95.8 kg (211 lb 3.2 oz)   01/06/25 97.1 kg (214 lb)   12/05/24 97.5 kg (215 lb)       ECOG PS: 0    Physical Examination:  General: Patient is alert and oriented, not in acute distress  Psych: Mood and affect are appropriate  Eyes: EOMI, PERRL  ENT: Oropharynx is clear, no adenopathy  CV: Regular rate and rhythm, normal S1S2, no murmurs, no LE edema  Respiratory: Lungs clear to auscultation bilaterally  GI/Abd: Soft, non-tender with normoactive bowel sounds, no hepatosplenomegaly  Neurological: Grossly intact   Lymphatics: No palpable cervical, supraclavicular, axillary, or inguinal lymphadenopathy  Skin: no  rashes or petechiae      Laboratory:  Lab Results   Component Value Date    WBC 6.3 06/28/2025    WBC 6.5 03/31/2025    WBC 8.1 12/31/2024    HGB 12.0 06/28/2025    HGB 12.4 03/31/2025    HGB 11.6 (L) 12/31/2024    HCT 36.9 06/28/2025    MCV 99.2 06/28/2025    MCH 32.3 06/28/2025    MCHC 32.5 06/28/2025    RDW 13.6 06/28/2025    .0 06/28/2025    .0 03/31/2025    .0 12/31/2024     Lab Results   Component Value Date    GLU 92 06/28/2025    BUN 8 (L) 06/28/2025    BUNCREA 11.9 07/11/2024    CREATSERUM 0.83 06/28/2025    CREATSERUM 0.84 07/11/2024    CREATSERUM 0.98 06/30/2023    ANIONGAP 6 06/28/2025    GFR 71 08/01/2017    GFRNAA 78 07/27/2022    GFRAA 90 07/27/2022    CA 9.2 06/28/2025    OSMOCALC 284 06/28/2025    ALKPHO 76 06/28/2025    AST 14 06/28/2025    ALT 14 06/28/2025    BILT 0.6 06/28/2025    TP 8.1 06/28/2025    TP 7.6 06/28/2025    ALB 4.1 06/28/2025    ALB 3.79 06/28/2025    GLOBULIN 4.0 (H) 06/28/2025    AGRATIO 1.1 11/27/2013     06/28/2025    K 4.1 06/28/2025     06/28/2025    CO2 28.0 06/28/2025     Lab Results   Component Value Date    INR 1.01 12/17/2024       Imaging:    PET CT reviewed     Impression & Plan:     54 -year-old female with 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  Less than 15% plasma cell involvement in the bone marrow and FLC ratio of 0.03    Smoldering Multiple myeloma  - 1.8g./dl Ig G Lambda  - FLC ratio of 0.05  - 15% plasma cells in marrow  PET CT from Dec 2024 no bone lesions.   Has less than 3 g/dL, not in IgA, patient history of bone marrow plasma cells less than 50%.    Monoclonal proteins are relatively stable, we will continue on active surveillance.  No role for daratumumab at this point.      Stable  hemoglobin, calcium and creatinine.    We will continue on active surveillance every 3 months with repeat labs,        Follow up in 3 months with repeat labs including CBC, Monoclonal protein  evaluation.     Anemia - Normal Iron levels, Vit B12, folic acid.     Fatigue - likely multifactorial including menopause. Less likely to be related to SMM.   Advised to follow up with GYN      Age appropriate cancer screening with PCP.    Follow up in 3 months.         Karuna Melgar MD  [facility]               [1]   Current Outpatient Medications on File Prior to Visit   Medication Sig Dispense Refill    progesterone (PROMETRIUM) 200 MG Oral Cap Take 1 capsule (200 mg total) by mouth nightly. 90 capsule 3    estradiol (VIVELLE-DOT) 0.025 MG/24HR Transdermal Patch Biweekly Place 1 patch onto the skin twice a week. 24 patch 3    LOSARTAN 50 MG Oral Tab TAKE 1 TABLET(50 MG) BY MOUTH DAILY 90 tablet 1    Calcium Citrate 250 MG Oral Tab       Omeprazole 40 MG Oral Capsule Delayed Release Take 1 capsule (40 mg total) by mouth daily. 90 capsule 3    VITAMIN D OR Take by mouth.      Cyanocobalamin (VITAMIN B 12 OR) Take by mouth.       No current facility-administered medications on file prior to visit.   [2]   Allergies  Allergen Reactions    Radiology Contrast Iodinated Dyes HIVES    Aspirin HIVES    Iodine (Topical)     Lisinopril Coughing     ACE cough

## 2025-08-14 ENCOUNTER — OFFICE VISIT (OUTPATIENT)
Dept: FAMILY MEDICINE CLINIC | Facility: CLINIC | Age: 55
End: 2025-08-14

## 2025-08-14 DIAGNOSIS — Z82.49 FH: CAD (CORONARY ARTERY DISEASE): ICD-10-CM

## 2025-08-14 DIAGNOSIS — Z00.00 ROUTINE GENERAL MEDICAL EXAMINATION AT A HEALTH CARE FACILITY: Primary | ICD-10-CM

## 2025-08-14 DIAGNOSIS — Z82.49 FAMILY HISTORY OF EARLY CAD: ICD-10-CM

## 2025-08-14 DIAGNOSIS — I10 PRIMARY HYPERTENSION: ICD-10-CM

## 2025-08-14 RX ORDER — LOSARTAN POTASSIUM 25 MG/1
TABLET ORAL
Qty: 90 TABLET | Refills: 3 | Status: SHIPPED | OUTPATIENT
Start: 2025-08-14

## 2025-08-14 RX ORDER — LOSARTAN POTASSIUM 50 MG/1
TABLET ORAL
Qty: 90 TABLET | Refills: 3 | Status: SHIPPED | OUTPATIENT
Start: 2025-08-14

## 2025-08-23 ENCOUNTER — HOSPITAL ENCOUNTER (OUTPATIENT)
Dept: CT IMAGING | Age: 55
Discharge: HOME | End: 2025-08-23
Attending: INTERNAL MEDICINE

## 2025-08-23 DIAGNOSIS — Z82.49 FH: CAD (CORONARY ARTERY DISEASE): ICD-10-CM

## 2025-08-23 DIAGNOSIS — Z82.49 FAMILY HISTORY OF EARLY CAD: ICD-10-CM

## 2025-08-23 DIAGNOSIS — I10 PRIMARY HYPERTENSION: ICD-10-CM

## 2025-09-01 VITALS
RESPIRATION RATE: 16 BRPM | DIASTOLIC BLOOD PRESSURE: 80 MMHG | OXYGEN SATURATION: 99 % | WEIGHT: 207 LBS | SYSTOLIC BLOOD PRESSURE: 124 MMHG | HEIGHT: 64 IN | HEART RATE: 96 BPM | BODY MASS INDEX: 35.34 KG/M2

## (undated) DIAGNOSIS — Z13.6 SCREENING FOR CARDIOVASCULAR CONDITION: Primary | ICD-10-CM

## (undated) NOTE — LETTER
08/10/17    Patient: Piper Diez  : 10/20/1970 Visit date: 8/10/2017    Dear  Dr. Pete Iniguez MD,    Thank you for referring Piper Mejiacate to my practice. Please find my assessment and plan below.                   Sincerely,       Dami Astudillo MD